# Patient Record
Sex: FEMALE | Race: WHITE | NOT HISPANIC OR LATINO | URBAN - METROPOLITAN AREA
[De-identification: names, ages, dates, MRNs, and addresses within clinical notes are randomized per-mention and may not be internally consistent; named-entity substitution may affect disease eponyms.]

---

## 2017-02-18 ENCOUNTER — EMERGENCY (EMERGENCY)
Facility: HOSPITAL | Age: 42
LOS: 1 days | Discharge: HOME | End: 2017-02-18
Attending: EMERGENCY MEDICINE
Payer: MEDICARE

## 2017-02-18 VITALS
TEMPERATURE: 98 F | SYSTOLIC BLOOD PRESSURE: 121 MMHG | DIASTOLIC BLOOD PRESSURE: 71 MMHG | HEART RATE: 86 BPM | WEIGHT: 130.07 LBS | RESPIRATION RATE: 16 BRPM | OXYGEN SATURATION: 100 %

## 2017-02-18 VITALS
HEART RATE: 87 BPM | DIASTOLIC BLOOD PRESSURE: 63 MMHG | OXYGEN SATURATION: 98 % | TEMPERATURE: 98 F | SYSTOLIC BLOOD PRESSURE: 110 MMHG | RESPIRATION RATE: 16 BRPM

## 2017-02-18 DIAGNOSIS — F31.9 BIPOLAR DISORDER, UNSPECIFIED: ICD-10-CM

## 2017-02-18 DIAGNOSIS — F32.89 OTHER SPECIFIED DEPRESSIVE EPISODES: ICD-10-CM

## 2017-02-18 DIAGNOSIS — R07.9 CHEST PAIN, UNSPECIFIED: ICD-10-CM

## 2017-02-18 DIAGNOSIS — Z86.73 PERSONAL HISTORY OF TRANSIENT ISCHEMIC ATTACK (TIA), AND CEREBRAL INFARCTION WITHOUT RESIDUAL DEFICITS: ICD-10-CM

## 2017-02-18 DIAGNOSIS — F41.9 ANXIETY DISORDER, UNSPECIFIED: ICD-10-CM

## 2017-02-18 DIAGNOSIS — Z88.2 ALLERGY STATUS TO SULFONAMIDES: ICD-10-CM

## 2017-02-18 LAB
ANION GAP SERPL CALC-SCNC: 6 MMOL/L — SIGNIFICANT CHANGE UP (ref 5–17)
BASOPHILS # BLD AUTO: 0.1 K/UL — SIGNIFICANT CHANGE UP (ref 0–0.2)
BASOPHILS NFR BLD AUTO: 1.2 % — SIGNIFICANT CHANGE UP (ref 0–2)
BUN SERPL-MCNC: 7 MG/DL — SIGNIFICANT CHANGE UP (ref 7–18)
CALCIUM SERPL-MCNC: 9.5 MG/DL — SIGNIFICANT CHANGE UP (ref 8.4–10.5)
CHLORIDE SERPL-SCNC: 108 MMOL/L — SIGNIFICANT CHANGE UP (ref 96–108)
CK SERPL-CCNC: 118 U/L — SIGNIFICANT CHANGE UP (ref 21–215)
CO2 SERPL-SCNC: 28 MMOL/L — SIGNIFICANT CHANGE UP (ref 22–31)
CREAT SERPL-MCNC: 0.72 MG/DL — SIGNIFICANT CHANGE UP (ref 0.5–1.3)
EOSINOPHIL # BLD AUTO: 0.1 K/UL — SIGNIFICANT CHANGE UP (ref 0–0.5)
EOSINOPHIL NFR BLD AUTO: 1.1 % — SIGNIFICANT CHANGE UP (ref 0–6)
GLUCOSE SERPL-MCNC: 78 MG/DL — SIGNIFICANT CHANGE UP (ref 70–99)
HCG SERPL-ACNC: <1 MIU/ML — SIGNIFICANT CHANGE UP
HCT VFR BLD CALC: 44.2 % — SIGNIFICANT CHANGE UP (ref 34.5–45)
HGB BLD-MCNC: 14.9 G/DL — SIGNIFICANT CHANGE UP (ref 11.5–15.5)
LYMPHOCYTES # BLD AUTO: 3.5 K/UL — HIGH (ref 1–3.3)
LYMPHOCYTES # BLD AUTO: 37.1 % — SIGNIFICANT CHANGE UP (ref 13–44)
MCHC RBC-ENTMCNC: 30.9 PG — SIGNIFICANT CHANGE UP (ref 27–34)
MCHC RBC-ENTMCNC: 33.7 GM/DL — SIGNIFICANT CHANGE UP (ref 32–36)
MCV RBC AUTO: 91.8 FL — SIGNIFICANT CHANGE UP (ref 80–100)
MONOCYTES # BLD AUTO: 0.6 K/UL — SIGNIFICANT CHANGE UP (ref 0–0.9)
MONOCYTES NFR BLD AUTO: 5.9 % — SIGNIFICANT CHANGE UP (ref 2–14)
NEUTROPHILS # BLD AUTO: 5.2 K/UL — SIGNIFICANT CHANGE UP (ref 1.8–7.4)
NEUTROPHILS NFR BLD AUTO: 54.8 % — SIGNIFICANT CHANGE UP (ref 43–77)
PLATELET # BLD AUTO: 318 K/UL — SIGNIFICANT CHANGE UP (ref 150–400)
POTASSIUM SERPL-MCNC: 3.7 MMOL/L — SIGNIFICANT CHANGE UP (ref 3.5–5.3)
POTASSIUM SERPL-SCNC: 3.7 MMOL/L — SIGNIFICANT CHANGE UP (ref 3.5–5.3)
RBC # BLD: 4.82 M/UL — SIGNIFICANT CHANGE UP (ref 3.8–5.2)
RBC # FLD: 11.8 % — SIGNIFICANT CHANGE UP (ref 10.3–14.5)
SODIUM SERPL-SCNC: 142 MMOL/L — SIGNIFICANT CHANGE UP (ref 135–145)
TROPONIN I SERPL-MCNC: <0.015 NG/ML — SIGNIFICANT CHANGE UP (ref 0–0.04)
WBC # BLD: 9.4 K/UL — SIGNIFICANT CHANGE UP (ref 3.8–10.5)
WBC # FLD AUTO: 9.4 K/UL — SIGNIFICANT CHANGE UP (ref 3.8–10.5)

## 2017-02-18 PROCEDURE — 99283 EMERGENCY DEPT VISIT LOW MDM: CPT | Mod: 25

## 2017-02-18 PROCEDURE — 82550 ASSAY OF CK (CPK): CPT

## 2017-02-18 PROCEDURE — 93005 ELECTROCARDIOGRAM TRACING: CPT

## 2017-02-18 PROCEDURE — 71046 X-RAY EXAM CHEST 2 VIEWS: CPT

## 2017-02-18 PROCEDURE — 84484 ASSAY OF TROPONIN QUANT: CPT

## 2017-02-18 PROCEDURE — 80048 BASIC METABOLIC PNL TOTAL CA: CPT

## 2017-02-18 PROCEDURE — 84702 CHORIONIC GONADOTROPIN TEST: CPT

## 2017-02-18 PROCEDURE — 99283 EMERGENCY DEPT VISIT LOW MDM: CPT

## 2017-02-18 PROCEDURE — 85027 COMPLETE CBC AUTOMATED: CPT

## 2017-02-18 PROCEDURE — 71020: CPT | Mod: 26

## 2017-02-18 RX ORDER — TRAMADOL HYDROCHLORIDE 50 MG/1
50 TABLET ORAL ONCE
Qty: 0 | Refills: 0 | Status: DISCONTINUED | OUTPATIENT
Start: 2017-02-18 | End: 2017-02-18

## 2017-02-18 RX ORDER — IBUPROFEN 200 MG
800 TABLET ORAL ONCE
Qty: 0 | Refills: 0 | Status: COMPLETED | OUTPATIENT
Start: 2017-02-18 | End: 2017-02-18

## 2017-02-18 RX ADMIN — Medication 800 MILLIGRAM(S): at 20:46

## 2017-02-18 RX ADMIN — TRAMADOL HYDROCHLORIDE 50 MILLIGRAM(S): 50 TABLET ORAL at 21:42

## 2017-02-18 RX ADMIN — Medication 800 MILLIGRAM(S): at 21:20

## 2017-02-18 NOTE — ED PROVIDER NOTE - OBJECTIVE STATEMENT
42 y/o F pt w/ PMHx of CVA, Pineal Gland Tumor, Anxiety, and Depression presents to ED c/o sharp, stabbing R sided chest pain x1 hour. Pt states that her pain radiates to her RUE; pt's pain is worsened w/ deep inspiration. Pt denies dyspnea, nausea, vomiting, lightheadedness, or any other complaints. Pt is not a smoker and reports no history of similar symptoms in the past. Pt is allergic to multiple drugs as listed.

## 2017-02-18 NOTE — ED PROVIDER NOTE - PMH
3/09 Seizure--etiology, abrupt withdrawal from benzodiazepines    Anorexia/bolemia from age [age]    Anxiety    Bipolar disorder NOS    borderline mood disorder    Brain tumor    Depression  multiple hospitalizations for attempted suicide---self committed herself to [facility] this past month.  Discharged 10/1/09  Lumbar Herniated Disc  intermittent b/l sciatica with R>L  Posttraumatic Stress Disorder  pt witnessed her father's suicide at the age of 38 with a gun at the age of 5  Brother, age 38, committed suicide on the father's anniversary of the father's suicide on 9/10/09  Smoker    Stroke  1 month ago  Syncope and collapse

## 2017-02-18 NOTE — ED ADULT NURSE NOTE - PMH
3/09 Seizure--etiology, abrupt withdrawal from benzodiazepines    Anorexia/bolemia from age [age]    Anxiety    Bipolar disorder NOS    borderline mood disorder    Brain tumor    Depression  multiple hospitalizations for attempted suicide---self committed herself to [facility] this past month.  Discharged 10/1/09  Lumbar Herniated Disc  intermittent b/l sciatica with R>L  Posttraumatic Stress Disorder  pt witnessed her father's suicide at the age of 38 with a gun at the age of 5  Brother, age 38, committed suicide on the father's anniversary of the father's suicide on 9/10/09  Smoker    Stroke  1 month ago  Syncope and collapse

## 2017-02-18 NOTE — ED ADULT NURSE NOTE - OBJECTIVE STATEMENT
Pt. is a&ox3, stable, and in no acute distress. Pt. c/o chest pain that is radiating to her right arm that started an hour ago while watching TV.

## 2017-03-19 ENCOUNTER — EMERGENCY (EMERGENCY)
Facility: HOSPITAL | Age: 42
LOS: 1 days | Discharge: HOME | End: 2017-03-19
Attending: EMERGENCY MEDICINE | Admitting: EMERGENCY MEDICINE
Payer: MEDICARE

## 2017-03-19 VITALS
WEIGHT: 139.99 LBS | TEMPERATURE: 98 F | SYSTOLIC BLOOD PRESSURE: 129 MMHG | HEART RATE: 94 BPM | OXYGEN SATURATION: 100 % | DIASTOLIC BLOOD PRESSURE: 82 MMHG | RESPIRATION RATE: 16 BRPM

## 2017-03-19 DIAGNOSIS — M79.662 PAIN IN LEFT LOWER LEG: ICD-10-CM

## 2017-03-19 LAB
BASOPHILS # BLD AUTO: 0.1 K/UL — SIGNIFICANT CHANGE UP (ref 0–0.2)
BASOPHILS NFR BLD AUTO: 0.6 % — SIGNIFICANT CHANGE UP (ref 0–2)
EOSINOPHIL # BLD AUTO: 0.1 K/UL — SIGNIFICANT CHANGE UP (ref 0–0.5)
EOSINOPHIL NFR BLD AUTO: 1.2 % — SIGNIFICANT CHANGE UP (ref 0–6)
HCT VFR BLD CALC: 42.7 % — SIGNIFICANT CHANGE UP (ref 34.5–45)
HGB BLD-MCNC: 14.4 G/DL — SIGNIFICANT CHANGE UP (ref 11.5–15.5)
LYMPHOCYTES # BLD AUTO: 3.1 K/UL — SIGNIFICANT CHANGE UP (ref 1–3.3)
LYMPHOCYTES # BLD AUTO: 33 % — SIGNIFICANT CHANGE UP (ref 13–44)
MCHC RBC-ENTMCNC: 31.7 PG — SIGNIFICANT CHANGE UP (ref 27–34)
MCHC RBC-ENTMCNC: 33.8 GM/DL — SIGNIFICANT CHANGE UP (ref 32–36)
MCV RBC AUTO: 93.8 FL — SIGNIFICANT CHANGE UP (ref 80–100)
MONOCYTES # BLD AUTO: 0.7 K/UL — SIGNIFICANT CHANGE UP (ref 0–0.9)
MONOCYTES NFR BLD AUTO: 8 % — SIGNIFICANT CHANGE UP (ref 2–14)
NEUTROPHILS # BLD AUTO: 5.3 K/UL — SIGNIFICANT CHANGE UP (ref 1.8–7.4)
NEUTROPHILS NFR BLD AUTO: 57.2 % — SIGNIFICANT CHANGE UP (ref 43–77)
PLATELET # BLD AUTO: 270 K/UL — SIGNIFICANT CHANGE UP (ref 150–400)
RBC # BLD: 4.55 M/UL — SIGNIFICANT CHANGE UP (ref 3.8–5.2)
RBC # FLD: 11.9 % — SIGNIFICANT CHANGE UP (ref 10.3–14.5)
WBC # BLD: 9.3 K/UL — SIGNIFICANT CHANGE UP (ref 3.8–10.5)
WBC # FLD AUTO: 9.3 K/UL — SIGNIFICANT CHANGE UP (ref 3.8–10.5)

## 2017-03-19 PROCEDURE — 93010 ELECTROCARDIOGRAM REPORT: CPT

## 2017-03-19 PROCEDURE — 99284 EMERGENCY DEPT VISIT MOD MDM: CPT | Mod: 25,GC

## 2017-03-19 NOTE — ED PROVIDER NOTE - OBJECTIVE STATEMENT
41yF with recent stroke (R sided weakness, improved, now on ASA 81 daily), smoker, remote h/o pineal gland tumor s/p radiation and chemo (in remission) presents with worsening LLE pain, sent in from urgent care for US to r/o DVT. Reports worsening LLE pain and cold foot x 2 weeks, worse today. Pain most severe in great toe. Pain making it difficult to ambulate. No exogenous estrogen. Week long admission after stroke last month. 41yF with recent stroke (R sided weakness, no residual, now on ASA 81 daily), smoker, remote h/o pineal gland tumor s/p radiation and chemo (in remission), chronic ankle pain on oxycodone 30mg 4 times daily presents with worsening LLE pain, sent in from urgent care for US to r/o DVT. Reports worsening LLE pain and cold foot x 2 weeks, worse today. Pain most severe in great toe. Pain making it difficult to ambulate. No exogenous estrogen. Week long admission after stroke last month.

## 2017-03-19 NOTE — ED PROVIDER NOTE - MEDICAL DECISION MAKING DETAILS
Nemes - 42yo F w recent dg of stroke in the ER for 2 wks of L foot pain, radiating in the L calf and cold sensation. No trauma. VS wnl, cooler L foot, cap refill mildly decreased compared to R foot, min decreased pulses. Mild L calf tenderness. Will get CTA r/o arterial obstruction, US r/o DVT.
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
## 2017-03-19 NOTE — ED ADULT TRIAGE NOTE - CHIEF COMPLAINT QUOTE
right calf pain x2 wks worsening today. Pt had gotten a pedicure and feels like her toe is infected.  Pt reports that her toe is discolored and the skin is cold. referred for ultrasound

## 2017-03-19 NOTE — ED PROVIDER NOTE - PROGRESS NOTE DETAILS
CTA negative. Evaluated by vascular, cleared for discharge, do not recommend outpatient vascular follow-up.  [place] PGY3

## 2017-03-19 NOTE — ED PROVIDER NOTE - PMH
3/09 Seizure--etiology, abrupt withdrawal from benzodiazepines    Anorexia/bolemia from age [age]    Anxiety    Back pain, unspecified back location, unspecified back pain laterality, unspecified chronicity    Bipolar disorder NOS    borderline mood disorder    Brain tumor    Cerebrovascular accident (CVA) due to other mechanism    Depression  multiple hospitalizations for attempted suicide---self committed herself to [facility] this past month.  Discharged 10/1/09  Lumbar Herniated Disc  intermittent b/l sciatica with R>L  Posttraumatic Stress Disorder  pt witnessed her father's suicide at the age of 38 with a gun at the age of 5  Brother, age 38, committed suicide on the father's anniversary of the father's suicide on 9/10/09  Smoker    Stroke  1 month ago  Syncope and collapse

## 2017-03-19 NOTE — ED ADULT NURSE NOTE - OBJECTIVE STATEMENT
40 yo ambulatory female pt presents to ed complaining of left great toe pain that radiates to the left calf. as per pt she got a pedicure two weeks ago and her two started to hurt than. pt states the pain became unbearable last night and she is no longer able to walk. cap refill brisk. pedal pulses strong and equal bilaterally. skin intact. no redness/warmth to toe or leg. breath sounds clear and equal bilaterally. abd nontender and nondistended. pt afebrile/ denies fever/chills/n/v.

## 2017-03-20 VITALS — DIASTOLIC BLOOD PRESSURE: 79 MMHG | SYSTOLIC BLOOD PRESSURE: 121 MMHG

## 2017-03-20 LAB
ALBUMIN SERPL ELPH-MCNC: 4.3 G/DL — SIGNIFICANT CHANGE UP (ref 3.3–5)
ALP SERPL-CCNC: 58 U/L — SIGNIFICANT CHANGE UP (ref 40–120)
ALT FLD-CCNC: 18 U/L RC — SIGNIFICANT CHANGE UP (ref 10–45)
ANION GAP SERPL CALC-SCNC: 14 MMOL/L — SIGNIFICANT CHANGE UP (ref 5–17)
APTT BLD: 31.1 SEC — SIGNIFICANT CHANGE UP (ref 27.5–37.4)
AST SERPL-CCNC: 20 U/L — SIGNIFICANT CHANGE UP (ref 10–40)
BILIRUB SERPL-MCNC: 0.2 MG/DL — SIGNIFICANT CHANGE UP (ref 0.2–1.2)
BUN SERPL-MCNC: 10 MG/DL — SIGNIFICANT CHANGE UP (ref 7–23)
CALCIUM SERPL-MCNC: 9.6 MG/DL — SIGNIFICANT CHANGE UP (ref 8.4–10.5)
CHLORIDE SERPL-SCNC: 103 MMOL/L — SIGNIFICANT CHANGE UP (ref 96–108)
CO2 SERPL-SCNC: 24 MMOL/L — SIGNIFICANT CHANGE UP (ref 22–31)
CREAT SERPL-MCNC: 0.75 MG/DL — SIGNIFICANT CHANGE UP (ref 0.5–1.3)
GAS PNL BLDV: SIGNIFICANT CHANGE UP
GLUCOSE SERPL-MCNC: 113 MG/DL — HIGH (ref 70–99)
INR BLD: 1.05 RATIO — SIGNIFICANT CHANGE UP (ref 0.88–1.16)
POTASSIUM SERPL-MCNC: 3.6 MMOL/L — SIGNIFICANT CHANGE UP (ref 3.5–5.3)
POTASSIUM SERPL-SCNC: 3.6 MMOL/L — SIGNIFICANT CHANGE UP (ref 3.5–5.3)
PROT SERPL-MCNC: 7.6 G/DL — SIGNIFICANT CHANGE UP (ref 6–8.3)
PROTHROM AB SERPL-ACNC: 11.4 SEC — SIGNIFICANT CHANGE UP (ref 10–13.1)
SODIUM SERPL-SCNC: 141 MMOL/L — SIGNIFICANT CHANGE UP (ref 135–145)

## 2017-03-20 PROCEDURE — 93971 EXTREMITY STUDY: CPT

## 2017-03-20 PROCEDURE — 82330 ASSAY OF CALCIUM: CPT

## 2017-03-20 PROCEDURE — 80053 COMPREHEN METABOLIC PANEL: CPT

## 2017-03-20 PROCEDURE — 85014 HEMATOCRIT: CPT

## 2017-03-20 PROCEDURE — 85730 THROMBOPLASTIN TIME PARTIAL: CPT

## 2017-03-20 PROCEDURE — 75635 CT ANGIO ABDOMINAL ARTERIES: CPT

## 2017-03-20 PROCEDURE — 82947 ASSAY GLUCOSE BLOOD QUANT: CPT

## 2017-03-20 PROCEDURE — 75635 CT ANGIO ABDOMINAL ARTERIES: CPT | Mod: 26

## 2017-03-20 PROCEDURE — 82550 ASSAY OF CK (CPK): CPT

## 2017-03-20 PROCEDURE — 93005 ELECTROCARDIOGRAM TRACING: CPT

## 2017-03-20 PROCEDURE — 96374 THER/PROPH/DIAG INJ IV PUSH: CPT | Mod: XU

## 2017-03-20 PROCEDURE — 82803 BLOOD GASES ANY COMBINATION: CPT

## 2017-03-20 PROCEDURE — 84132 ASSAY OF SERUM POTASSIUM: CPT

## 2017-03-20 PROCEDURE — 85027 COMPLETE CBC AUTOMATED: CPT

## 2017-03-20 PROCEDURE — 85610 PROTHROMBIN TIME: CPT

## 2017-03-20 PROCEDURE — 83605 ASSAY OF LACTIC ACID: CPT

## 2017-03-20 PROCEDURE — 99284 EMERGENCY DEPT VISIT MOD MDM: CPT | Mod: 25

## 2017-03-20 PROCEDURE — 93971 EXTREMITY STUDY: CPT | Mod: 26

## 2017-03-20 PROCEDURE — 82435 ASSAY OF BLOOD CHLORIDE: CPT

## 2017-03-20 PROCEDURE — 84295 ASSAY OF SERUM SODIUM: CPT

## 2017-03-20 RX ORDER — ALPRAZOLAM 0.25 MG
1 TABLET ORAL ONCE
Qty: 0 | Refills: 0 | Status: DISCONTINUED | OUTPATIENT
Start: 2017-03-20 | End: 2017-03-20

## 2017-03-20 RX ORDER — OXYCODONE HYDROCHLORIDE 5 MG/1
10 TABLET ORAL ONCE
Qty: 0 | Refills: 0 | Status: DISCONTINUED | OUTPATIENT
Start: 2017-03-20 | End: 2017-03-20

## 2017-03-20 RX ORDER — ACETAMINOPHEN 500 MG
1000 TABLET ORAL ONCE
Qty: 0 | Refills: 0 | Status: COMPLETED | OUTPATIENT
Start: 2017-03-20 | End: 2017-03-20

## 2017-03-20 RX ADMIN — OXYCODONE HYDROCHLORIDE 10 MILLIGRAM(S): 5 TABLET ORAL at 04:50

## 2017-03-20 RX ADMIN — Medication 400 MILLIGRAM(S): at 02:28

## 2017-03-20 RX ADMIN — Medication 1000 MILLIGRAM(S): at 03:00

## 2017-03-20 RX ADMIN — Medication 1 MILLIGRAM(S): at 01:56

## 2017-03-20 RX ADMIN — OXYCODONE HYDROCHLORIDE 10 MILLIGRAM(S): 5 TABLET ORAL at 04:15

## 2017-03-20 NOTE — ED ADULT NURSE REASSESSMENT NOTE - NS ED NURSE REASSESS COMMENT FT1
pt off to CTA scan  spouse at bedside
pt refused vitals being taken upon discharge.
pt sleeping in bed comfortably. family at bedside. safety maintained. will continue to monitor.
vascular at bedside.
VBG drawn and sent as per MD orders  pt complaining of increase in pain, 7/10  MD Hubbard notified, will order Ofirmev for pain control  UCG negative, CT scan notified

## 2017-03-21 NOTE — ED POST DISCHARGE NOTE - ADDITIONAL DOCUMENTATION
pt called with concern for low o2 level by VBG in blood work. pt reports that when she stands up "everything goes black" and she feels as though she is going to "pass out." pt instructed to return to Emergency Department now. pt understands.

## 2017-03-29 ENCOUNTER — APPOINTMENT (OUTPATIENT)
Dept: NEUROLOGY | Facility: CLINIC | Age: 42
End: 2017-03-29

## 2017-05-11 ENCOUNTER — TRANSCRIPTION ENCOUNTER (OUTPATIENT)
Age: 42
End: 2017-05-11

## 2017-05-11 ENCOUNTER — INPATIENT (INPATIENT)
Facility: HOSPITAL | Age: 42
LOS: 0 days | Discharge: HOME | DRG: 312 | End: 2017-05-12
Attending: INTERNAL MEDICINE | Admitting: INTERNAL MEDICINE
Payer: COMMERCIAL

## 2017-05-11 VITALS
HEIGHT: 67 IN | RESPIRATION RATE: 18 BRPM | DIASTOLIC BLOOD PRESSURE: 66 MMHG | HEART RATE: 60 BPM | OXYGEN SATURATION: 100 % | WEIGHT: 126.99 LBS | SYSTOLIC BLOOD PRESSURE: 142 MMHG | TEMPERATURE: 98 F

## 2017-05-11 DIAGNOSIS — R45.851 SUICIDAL IDEATIONS: ICD-10-CM

## 2017-05-11 DIAGNOSIS — F19.10 OTHER PSYCHOACTIVE SUBSTANCE ABUSE, UNCOMPLICATED: ICD-10-CM

## 2017-05-11 DIAGNOSIS — F17.200 NICOTINE DEPENDENCE, UNSPECIFIED, UNCOMPLICATED: ICD-10-CM

## 2017-05-11 DIAGNOSIS — R55 SYNCOPE AND COLLAPSE: ICD-10-CM

## 2017-05-11 DIAGNOSIS — F32.9 MAJOR DEPRESSIVE DISORDER, SINGLE EPISODE, UNSPECIFIED: ICD-10-CM

## 2017-05-11 DIAGNOSIS — Z29.9 ENCOUNTER FOR PROPHYLACTIC MEASURES, UNSPECIFIED: ICD-10-CM

## 2017-05-11 LAB
ALBUMIN SERPL ELPH-MCNC: 3.1 G/DL — LOW (ref 3.5–5)
ALBUMIN SERPL ELPH-MCNC: 3.8 G/DL — SIGNIFICANT CHANGE UP (ref 3.5–5)
ALP SERPL-CCNC: 47 U/L — SIGNIFICANT CHANGE UP (ref 40–120)
ALP SERPL-CCNC: 55 U/L — SIGNIFICANT CHANGE UP (ref 40–120)
ALT FLD-CCNC: 20 U/L DA — SIGNIFICANT CHANGE UP (ref 10–60)
ALT FLD-CCNC: 21 U/L DA — SIGNIFICANT CHANGE UP (ref 10–60)
ANION GAP SERPL CALC-SCNC: 4 MMOL/L — LOW (ref 5–17)
ANION GAP SERPL CALC-SCNC: 7 MMOL/L — SIGNIFICANT CHANGE UP (ref 5–17)
APAP SERPL-MCNC: <2 UG/ML — LOW (ref 10–30)
APPEARANCE UR: CLEAR — SIGNIFICANT CHANGE UP
AST SERPL-CCNC: 15 U/L — SIGNIFICANT CHANGE UP (ref 10–40)
AST SERPL-CCNC: 15 U/L — SIGNIFICANT CHANGE UP (ref 10–40)
BASOPHILS # BLD AUTO: 0.1 K/UL — SIGNIFICANT CHANGE UP (ref 0–0.2)
BASOPHILS # BLD AUTO: 0.1 K/UL — SIGNIFICANT CHANGE UP (ref 0–0.2)
BASOPHILS NFR BLD AUTO: 0.9 % — SIGNIFICANT CHANGE UP (ref 0–2)
BASOPHILS NFR BLD AUTO: 1.2 % — SIGNIFICANT CHANGE UP (ref 0–2)
BILIRUB SERPL-MCNC: 0.2 MG/DL — SIGNIFICANT CHANGE UP (ref 0.2–1.2)
BILIRUB SERPL-MCNC: 0.2 MG/DL — SIGNIFICANT CHANGE UP (ref 0.2–1.2)
BILIRUB UR-MCNC: NEGATIVE — SIGNIFICANT CHANGE UP
BUN SERPL-MCNC: 7 MG/DL — SIGNIFICANT CHANGE UP (ref 7–18)
BUN SERPL-MCNC: 9 MG/DL — SIGNIFICANT CHANGE UP (ref 7–18)
CALCIUM SERPL-MCNC: 8.4 MG/DL — SIGNIFICANT CHANGE UP (ref 8.4–10.5)
CALCIUM SERPL-MCNC: 9.3 MG/DL — SIGNIFICANT CHANGE UP (ref 8.4–10.5)
CHLORIDE SERPL-SCNC: 108 MMOL/L — SIGNIFICANT CHANGE UP (ref 96–108)
CHLORIDE SERPL-SCNC: 111 MMOL/L — HIGH (ref 96–108)
CHOLEST SERPL-MCNC: 141 MG/DL — SIGNIFICANT CHANGE UP (ref 10–199)
CK MB BLD-MCNC: 2.1 % — SIGNIFICANT CHANGE UP (ref 0–3.5)
CK MB CFR SERPL CALC: 1.4 NG/ML — SIGNIFICANT CHANGE UP (ref 0–3.6)
CK SERPL-CCNC: 68 U/L — SIGNIFICANT CHANGE UP (ref 21–215)
CO2 SERPL-SCNC: 28 MMOL/L — SIGNIFICANT CHANGE UP (ref 22–31)
CO2 SERPL-SCNC: 31 MMOL/L — SIGNIFICANT CHANGE UP (ref 22–31)
COLOR SPEC: YELLOW — SIGNIFICANT CHANGE UP
CREAT SERPL-MCNC: 0.64 MG/DL — SIGNIFICANT CHANGE UP (ref 0.5–1.3)
CREAT SERPL-MCNC: 0.75 MG/DL — SIGNIFICANT CHANGE UP (ref 0.5–1.3)
DIFF PNL FLD: ABNORMAL
EOSINOPHIL # BLD AUTO: 0.1 K/UL — SIGNIFICANT CHANGE UP (ref 0–0.5)
EOSINOPHIL # BLD AUTO: 0.1 K/UL — SIGNIFICANT CHANGE UP (ref 0–0.5)
EOSINOPHIL NFR BLD AUTO: 1 % — SIGNIFICANT CHANGE UP (ref 0–6)
EOSINOPHIL NFR BLD AUTO: 1.9 % — SIGNIFICANT CHANGE UP (ref 0–6)
ETHANOL SERPL-MCNC: <3 MG/DL — SIGNIFICANT CHANGE UP (ref 0–10)
FOLATE SERPL-MCNC: 9.1 NG/ML — SIGNIFICANT CHANGE UP (ref 4.8–24.2)
GLUCOSE SERPL-MCNC: 105 MG/DL — HIGH (ref 70–99)
GLUCOSE SERPL-MCNC: 78 MG/DL — SIGNIFICANT CHANGE UP (ref 70–99)
GLUCOSE UR QL: NEGATIVE — SIGNIFICANT CHANGE UP
HBA1C BLD-MCNC: 5.3 % — SIGNIFICANT CHANGE UP (ref 4–5.6)
HCG UR QL: NEGATIVE — SIGNIFICANT CHANGE UP
HCT VFR BLD CALC: 34.5 % — SIGNIFICANT CHANGE UP (ref 34.5–45)
HCT VFR BLD CALC: 37.3 % — SIGNIFICANT CHANGE UP (ref 34.5–45)
HDLC SERPL-MCNC: 39 MG/DL — LOW (ref 40–125)
HGB BLD-MCNC: 11.4 G/DL — LOW (ref 11.5–15.5)
HGB BLD-MCNC: 12.7 G/DL — SIGNIFICANT CHANGE UP (ref 11.5–15.5)
KETONES UR-MCNC: NEGATIVE — SIGNIFICANT CHANGE UP
LEUKOCYTE ESTERASE UR-ACNC: NEGATIVE — SIGNIFICANT CHANGE UP
LIDOCAIN IGE QN: 65 U/L — LOW (ref 73–393)
LIPID PNL WITH DIRECT LDL SERPL: 83 MG/DL — SIGNIFICANT CHANGE UP
LYMPHOCYTES # BLD AUTO: 3.8 K/UL — HIGH (ref 1–3.3)
LYMPHOCYTES # BLD AUTO: 4.2 K/UL — HIGH (ref 1–3.3)
LYMPHOCYTES # BLD AUTO: 46.9 % — HIGH (ref 13–44)
LYMPHOCYTES # BLD AUTO: 57.5 % — HIGH (ref 13–44)
MAGNESIUM SERPL-MCNC: 1.9 MG/DL — SIGNIFICANT CHANGE UP (ref 1.8–2.4)
MCHC RBC-ENTMCNC: 31.3 PG — SIGNIFICANT CHANGE UP (ref 27–34)
MCHC RBC-ENTMCNC: 31.7 PG — SIGNIFICANT CHANGE UP (ref 27–34)
MCHC RBC-ENTMCNC: 33 GM/DL — SIGNIFICANT CHANGE UP (ref 32–36)
MCHC RBC-ENTMCNC: 34.1 GM/DL — SIGNIFICANT CHANGE UP (ref 32–36)
MCV RBC AUTO: 93 FL — SIGNIFICANT CHANGE UP (ref 80–100)
MCV RBC AUTO: 94.7 FL — SIGNIFICANT CHANGE UP (ref 80–100)
MONOCYTES # BLD AUTO: 0.5 K/UL — SIGNIFICANT CHANGE UP (ref 0–0.9)
MONOCYTES # BLD AUTO: 0.5 K/UL — SIGNIFICANT CHANGE UP (ref 0–0.9)
MONOCYTES NFR BLD AUTO: 6.4 % — SIGNIFICANT CHANGE UP (ref 2–14)
MONOCYTES NFR BLD AUTO: 7.2 % — SIGNIFICANT CHANGE UP (ref 2–14)
NEUTROPHILS # BLD AUTO: 2.4 K/UL — SIGNIFICANT CHANGE UP (ref 1.8–7.4)
NEUTROPHILS # BLD AUTO: 3.7 K/UL — SIGNIFICANT CHANGE UP (ref 1.8–7.4)
NEUTROPHILS NFR BLD AUTO: 32.2 % — LOW (ref 43–77)
NEUTROPHILS NFR BLD AUTO: 44.8 % — SIGNIFICANT CHANGE UP (ref 43–77)
NITRITE UR-MCNC: NEGATIVE — SIGNIFICANT CHANGE UP
PCP SPEC-MCNC: SIGNIFICANT CHANGE UP
PH UR: 7 — SIGNIFICANT CHANGE UP (ref 5–8)
PHOSPHATE SERPL-MCNC: 2.2 MG/DL — LOW (ref 2.5–4.5)
PLATELET # BLD AUTO: 233 K/UL — SIGNIFICANT CHANGE UP (ref 150–400)
PLATELET # BLD AUTO: 282 K/UL — SIGNIFICANT CHANGE UP (ref 150–400)
POTASSIUM SERPL-MCNC: 3.6 MMOL/L — SIGNIFICANT CHANGE UP (ref 3.5–5.3)
POTASSIUM SERPL-MCNC: 3.7 MMOL/L — SIGNIFICANT CHANGE UP (ref 3.5–5.3)
POTASSIUM SERPL-SCNC: 3.6 MMOL/L — SIGNIFICANT CHANGE UP (ref 3.5–5.3)
POTASSIUM SERPL-SCNC: 3.7 MMOL/L — SIGNIFICANT CHANGE UP (ref 3.5–5.3)
PROT SERPL-MCNC: 6.1 G/DL — SIGNIFICANT CHANGE UP (ref 6–8.3)
PROT SERPL-MCNC: 7.7 G/DL — SIGNIFICANT CHANGE UP (ref 6–8.3)
PROT UR-MCNC: NEGATIVE — SIGNIFICANT CHANGE UP
RBC # BLD: 3.64 M/UL — LOW (ref 3.8–5.2)
RBC # BLD: 4 M/UL — SIGNIFICANT CHANGE UP (ref 3.8–5.2)
RBC # FLD: 11.7 % — SIGNIFICANT CHANGE UP (ref 10.3–14.5)
RBC # FLD: 12.1 % — SIGNIFICANT CHANGE UP (ref 10.3–14.5)
SALICYLATES SERPL-MCNC: 5.3 MG/DL — SIGNIFICANT CHANGE UP (ref 2.8–20)
SODIUM SERPL-SCNC: 143 MMOL/L — SIGNIFICANT CHANGE UP (ref 135–145)
SODIUM SERPL-SCNC: 146 MMOL/L — HIGH (ref 135–145)
SP GR SPEC: 1.01 — SIGNIFICANT CHANGE UP (ref 1.01–1.02)
TOTAL CHOLESTEROL/HDL RATIO MEASUREMENT: 3.6 RATIO — SIGNIFICANT CHANGE UP (ref 3.3–7.1)
TRIGL SERPL-MCNC: 94 MG/DL — SIGNIFICANT CHANGE UP (ref 10–149)
TROPONIN I SERPL-MCNC: <0.015 NG/ML — SIGNIFICANT CHANGE UP (ref 0–0.04)
TROPONIN I SERPL-MCNC: <0.015 NG/ML — SIGNIFICANT CHANGE UP (ref 0–0.04)
UROBILINOGEN FLD QL: NEGATIVE — SIGNIFICANT CHANGE UP
VIT B12 SERPL-MCNC: 807 PG/ML — SIGNIFICANT CHANGE UP (ref 243–894)
WBC # BLD: 7.3 K/UL — SIGNIFICANT CHANGE UP (ref 3.8–10.5)
WBC # BLD: 8.2 K/UL — SIGNIFICANT CHANGE UP (ref 3.8–10.5)
WBC # FLD AUTO: 7.3 K/UL — SIGNIFICANT CHANGE UP (ref 3.8–10.5)
WBC # FLD AUTO: 8.2 K/UL — SIGNIFICANT CHANGE UP (ref 3.8–10.5)

## 2017-05-11 PROCEDURE — 70450 CT HEAD/BRAIN W/O DYE: CPT | Mod: 26

## 2017-05-11 PROCEDURE — 71010: CPT | Mod: 26

## 2017-05-11 PROCEDURE — 99223 1ST HOSP IP/OBS HIGH 75: CPT | Mod: AI,GC

## 2017-05-11 PROCEDURE — 99285 EMERGENCY DEPT VISIT HI MDM: CPT | Mod: 25

## 2017-05-11 PROCEDURE — 99222 1ST HOSP IP/OBS MODERATE 55: CPT

## 2017-05-11 RX ORDER — ONDANSETRON 8 MG/1
4 TABLET, FILM COATED ORAL ONCE
Qty: 0 | Refills: 0 | Status: COMPLETED | OUTPATIENT
Start: 2017-05-11 | End: 2017-05-11

## 2017-05-11 RX ORDER — NICOTINE POLACRILEX 2 MG
1 GUM BUCCAL DAILY
Qty: 0 | Refills: 0 | Status: DISCONTINUED | OUTPATIENT
Start: 2017-05-11 | End: 2017-05-12

## 2017-05-11 RX ORDER — ALPRAZOLAM 0.25 MG
1 TABLET ORAL THREE TIMES A DAY
Qty: 0 | Refills: 0 | Status: DISCONTINUED | OUTPATIENT
Start: 2017-05-11 | End: 2017-05-12

## 2017-05-11 RX ORDER — SODIUM CHLORIDE 9 MG/ML
1000 INJECTION INTRAMUSCULAR; INTRAVENOUS; SUBCUTANEOUS
Qty: 0 | Refills: 0 | Status: DISCONTINUED | OUTPATIENT
Start: 2017-05-11 | End: 2017-05-11

## 2017-05-11 RX ORDER — DEXTROAMPHETAMINE SACCHARATE, AMPHETAMINE ASPARTATE, DEXTROAMPHETAMINE SULFATE AND AMPHETAMINE SULFATE 1.875; 1.875; 1.875; 1.875 MG/1; MG/1; MG/1; MG/1
30 TABLET ORAL
Qty: 0 | Refills: 0 | Status: DISCONTINUED | OUTPATIENT
Start: 2017-05-11 | End: 2017-05-11

## 2017-05-11 RX ORDER — FLUOXETINE HCL 10 MG
20 CAPSULE ORAL DAILY
Qty: 0 | Refills: 0 | Status: DISCONTINUED | OUTPATIENT
Start: 2017-05-11 | End: 2017-05-12

## 2017-05-11 RX ORDER — PANTOPRAZOLE SODIUM 20 MG/1
40 TABLET, DELAYED RELEASE ORAL
Qty: 0 | Refills: 0 | Status: DISCONTINUED | OUTPATIENT
Start: 2017-05-11 | End: 2017-05-12

## 2017-05-11 RX ORDER — ONDANSETRON 8 MG/1
4 TABLET, FILM COATED ORAL EVERY 8 HOURS
Qty: 0 | Refills: 0 | Status: DISCONTINUED | OUTPATIENT
Start: 2017-05-11 | End: 2017-05-12

## 2017-05-11 RX ORDER — SODIUM CHLORIDE 9 MG/ML
1000 INJECTION INTRAMUSCULAR; INTRAVENOUS; SUBCUTANEOUS ONCE
Qty: 0 | Refills: 0 | Status: COMPLETED | OUTPATIENT
Start: 2017-05-11 | End: 2017-05-11

## 2017-05-11 RX ORDER — OXYCODONE HYDROCHLORIDE 5 MG/1
1 TABLET ORAL
Qty: 0 | Refills: 0 | COMMUNITY

## 2017-05-11 RX ORDER — OXYCODONE HYDROCHLORIDE 5 MG/1
5 TABLET ORAL EVERY 6 HOURS
Qty: 0 | Refills: 0 | Status: DISCONTINUED | OUTPATIENT
Start: 2017-05-11 | End: 2017-05-12

## 2017-05-11 RX ORDER — SODIUM CHLORIDE 9 MG/ML
1000 INJECTION INTRAMUSCULAR; INTRAVENOUS; SUBCUTANEOUS
Qty: 0 | Refills: 0 | Status: DISCONTINUED | OUTPATIENT
Start: 2017-05-11 | End: 2017-05-12

## 2017-05-11 RX ORDER — RISPERIDONE 4 MG/1
1 TABLET ORAL
Qty: 0 | Refills: 0 | COMMUNITY

## 2017-05-11 RX ADMIN — SODIUM CHLORIDE 60 MILLILITER(S): 9 INJECTION INTRAMUSCULAR; INTRAVENOUS; SUBCUTANEOUS at 07:06

## 2017-05-11 RX ADMIN — Medication 20 MILLIGRAM(S): at 15:36

## 2017-05-11 RX ADMIN — Medication 1 PATCH: at 15:36

## 2017-05-11 RX ADMIN — PANTOPRAZOLE SODIUM 40 MILLIGRAM(S): 20 TABLET, DELAYED RELEASE ORAL at 15:36

## 2017-05-11 RX ADMIN — OXYCODONE HYDROCHLORIDE 5 MILLIGRAM(S): 5 TABLET ORAL at 20:33

## 2017-05-11 RX ADMIN — OXYCODONE HYDROCHLORIDE 5 MILLIGRAM(S): 5 TABLET ORAL at 19:35

## 2017-05-11 RX ADMIN — ONDANSETRON 4 MILLIGRAM(S): 8 TABLET, FILM COATED ORAL at 02:26

## 2017-05-11 RX ADMIN — SODIUM CHLORIDE 1000 MILLILITER(S): 9 INJECTION INTRAMUSCULAR; INTRAVENOUS; SUBCUTANEOUS at 02:26

## 2017-05-11 RX ADMIN — SODIUM CHLORIDE 1000 MILLILITER(S): 9 INJECTION INTRAMUSCULAR; INTRAVENOUS; SUBCUTANEOUS at 02:27

## 2017-05-11 RX ADMIN — Medication 1 MILLIGRAM(S): at 13:03

## 2017-05-11 RX ADMIN — SODIUM CHLORIDE 70 MILLILITER(S): 9 INJECTION INTRAMUSCULAR; INTRAVENOUS; SUBCUTANEOUS at 13:00

## 2017-05-11 NOTE — DISCHARGE NOTE ADULT - NSTOBACCOHOTLINE_GEN_A_CS
[facility] Smokers Quitline ([phone]) [facility] Smokers Quitline ([phone])

## 2017-05-11 NOTE — H&P ADULT - NEUROLOGICAL DETAILS
deep reflexes intact/superficial reflexes intact/responds to pain/no spontaneous movement/alert and oriented x 3/normal strength/sensation intact/cranial nerves intact/responds to verbal commands

## 2017-05-11 NOTE — BEHAVIORAL HEALTH ASSESSMENT NOTE - NSBHCHARTREVIEWVS_PSY_A_CORE FT
Vital Signs Last 24 Hrs  T(C): 36.8, Max: 36.9 (05-11 @ 00:26)  T(F): 98.2, Max: 98.4 (05-11 @ 00:26)  HR: 79 (54 - 79)  BP: 96/55 (96/55 - 142/66)  BP(mean): --  RR: 18 (18 - 18)  SpO2: 99% (99% - 100%)

## 2017-05-11 NOTE — H&P ADULT - NEGATIVE ENMT SYMPTOMS
no nasal congestion/no ear pain/no hearing difficulty/no vertigo/no nasal discharge/no sinus symptoms/no tinnitus

## 2017-05-11 NOTE — DISCHARGE NOTE ADULT - NS AS DC STROKE ED MATERIALS
Need for Followup After Discharge/Stroke Warning Signs and Symptoms/Risk Factors for Stroke/Call 911 for Stroke/Prescribed Medications/Stroke Education Booklet

## 2017-05-11 NOTE — H&P ADULT - NEGATIVE CARDIOVASCULAR SYMPTOMS
no chest pain/no palpitations/no orthopnea/no paroxysmal nocturnal dyspnea/no dyspnea on exertion/no peripheral edema
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
## 2017-05-11 NOTE — BEHAVIORAL HEALTH ASSESSMENT NOTE - NSBHCHARTREVIEWINVESTIGATE_PSY_A_CORE FT
Ventricular Rate 96 BPM    Atrial Rate 96 BPM    P-R Interval 158 ms    QRS Duration 88 ms     ms    QTc 424 ms    P Axis 64 degrees    R Axis 73 degrees    T Axis 43 degrees    Diagnosis Line NORMAL SINUS RHYTHM  NORMAL ECG
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
## 2017-05-11 NOTE — H&P ADULT - GASTROINTESTINAL DETAILS
no rigidity/no rebound tenderness/no guarding/soft/no distention/normal/no masses palpable/bowel sounds normal/nontender

## 2017-05-11 NOTE — ED PROVIDER NOTE - MEDICAL DECISION MAKING DETAILS
40 y/o F with syncopal episode and decreased appetite/weakness x several days. Will check labs, give fluids, Zofran and re-assess.

## 2017-05-11 NOTE — H&P ADULT - NEGATIVE GASTROINTESTINAL SYMPTOMS
no change in bowel habits/no hematochezia/no melena/no jaundice/no diarrhea/no abdominal pain/no flatulence/no steatorrhea
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
## 2017-05-11 NOTE — ED PROVIDER NOTE - NS ED MD SCRIBE ATTENDING SCRIBE SECTIONS
REVIEW OF SYSTEMS/HIV/PAST MEDICAL/SURGICAL/SOCIAL HISTORY/HISTORY OF PRESENT ILLNESS/DISPOSITION/VITAL SIGNS( Pullset)/PHYSICAL EXAM
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
## 2017-05-11 NOTE — H&P ADULT - PROBLEM SELECTOR PLAN 4
Active smoker counselled extensively and interested in quitting. Nicotine patch offered and accepted

## 2017-05-11 NOTE — H&P ADULT - ATTENDING COMMENTS
Patient seen/evaluated at bedside. I agree with the resident progess note/outlined plan of care. My independent findings conclusions are documented.    Care assumed from another provider. Chart details reviewed.  Briefly, this is a 42 y/o female with pmhx of bipolar disorder, prior suicidal ideation on antidepressants, GERD endorses history of 4 ulcers and Schaztki's rings previously requiring balloon dilatation approx 2 years ago p/w syncopal episode and retching/nausea/anorexia. Patient state, she was in a stooped position in the bathroom when she suddenly syncopized. She denied prodrome of lightheadedness, visual dimming/ diaphoresis prior to this episode. Patient denies recent travel, lower extremity swelling/calf tenderness. Denies chest pain, burning chest discomfort or sensation of food sticking in her throat. States she is currently constipated x 3 days though her last bowel movement was a diarrheal BM. No melena, bright red blood per rectum. Patient had recent increase in prozac to 20mg po q day with continuation of her outpt xanax 1 mg tid regimen by her outpatient psychiatrist.    Patient ate a banana without difficulty on arrival to the medical floor    She was seen by inpatient psychiatry--> who did not recommend continuation of 1:1 safety observer    PE: as per HPI--> essentially unremarkable AAOx3 NAD, non-toxic appearing  (+) subjective flank and suprapubic pain, non tender on exam    Urinalysis: negative    1. syncope  2. dehydration  3. nausea/vomiting  4. history of GI ulcer (unknown location)  5. history of Shatzki's ring  6. major depressive disorder    Of note, patient had a CT abd- angio (March 2017): which was w/out acute process (pt reports she doesn't remember why it was done)  appears to have been dehydrated  -symptoms could be secondary to   -check xray abdomen, 2 view  -appreciate psych input: no evidence   - Patient seen/evaluated at bedside. I agree with the resident progess note/outlined plan of care. My independent findings conclusions are documented.    Care assumed from another provider. Chart details reviewed.  Briefly, this is a 40 y/o female with pmhx of bipolar disorder, prior suicidal ideation on antidepressants, GERD endorses history of 4 ulcers and Schaztki's rings previously requiring balloon dilatation approx 2 years ago p/w syncopal episode and retching/nausea/anorexia. Patient state, she was in a stooped position in the bathroom when she suddenly syncopized. She denied prodrome of lightheadedness, visual dimming/ diaphoresis prior to this episode. Patient denies recent travel, lower extremity swelling/calf tenderness. Denies chest pain, burning chest discomfort or sensation of food sticking in her throat. States she is currently constipated x 3 days though her last bowel movement was a diarrheal BM. No melena, bright red blood per rectum. Patient had recent increase in prozac to 20mg po q day with continuation of her outpt xanax 1 mg tid regimen by her outpatient psychiatrist.    Patient ate a banana without difficulty on arrival to the medical floor    She was seen by inpatient psychiatry--> who did not recommend continuation of 1:1 safety observer    PE: as per HPI--> essentially unremarkable AAOx3 NAD, non-toxic appearing  (+) subjective flank and suprapubic pain, non tender on exam    Urinalysis: negative    1. syncope  2. dehydration  3. nausea/vomiting  4. history of GI ulcer (unknown location)  5. history of Shatzki's ring  6. major depressive disorder    Of note, patient had a CT abd- angio (March 2017): which was w/out acute process (pt reports she doesn't remember why it was done)  appears to have been dehydrated, c/w IVF hydration  -TTE--> discussed with echo tech who will perform echo this evening  -symptoms could be secondary to primary gi process, however, has multiple other complaints in varying organ systems that may suggest component of anxiety as well  -reviewed case with Gi service, plan for likely EGD tomorrow, pending TTE  -check xray abdomen, 2 view  -appreciate psych input: no evidence of suicidal ideation  -patient's urine toxicology is positive for her prescription drugs, except for THC  -24 hour notice to be given today--> plan of care discussed with patient/daughter Patient seen/evaluated at bedside. I agree with the resident progess note/outlined plan of care. My independent findings conclusions are documented.    Care assumed from another provider. Chart details reviewed.  Briefly, this is a 40 y/o female with pmhx of bipolar disorder, prior suicidal ideation on antidepressants, GERD endorses history of 4 ulcers and Schaztki's rings previously requiring balloon dilatation approx 2 years ago p/w syncopal episode and retching/nausea/anorexia. Patient state, she was in a stooped position in the bathroom when she suddenly syncopized. She denied prodrome of lightheadedness, visual dimming/ diaphoresis prior to this episode. Patient denies recent travel, lower extremity swelling/calf tenderness. Denies chest pain, burning chest discomfort or sensation of food sticking in her throat. States she is currently constipated x 3 days though her last bowel movement was a diarrheal BM. No melena, bright red blood per rectum. Patient had recent increase in prozac to 20mg po q day with continuation of her outpt xanax 1 mg tid regimen by her outpatient psychiatrist.    Patient ate a banana without difficulty on arrival to the medical floor    She was seen by inpatient psychiatry--> who did not recommend continuation of 1:1 safety observer    PE: as per HPI--> essentially unremarkable AAOx3 NAD, non-toxic appearing  (+) subjective flank and suprapubic pain, non tender on exam    Urinalysis: negative    1. syncope  2. dehydration  3. nausea/vomiting  4. history of GI ulcer (unknown location)  5. history of Shatzki's ring  6. major depressive disorder  7. continuous tobacco use disorder    Of note, patient had a CT abd- angio (March 2017): which was w/out acute process (pt reports she doesn't remember why it was done)  appears to have been dehydrated, c/w IVF hydration  -TTE--> discussed with echo tech who will perform echo this evening  -telemetry was discontinued earlier today  -symptoms could be secondary to primary gi process, however, has multiple other complaints in varying organ systems that may suggest component of anxiety as well  -reviewed case with Gi service, plan for likely EGD tomorrow, pending TTE  -check xray abdomen, 2 view  -appreciate psych input: no evidence of suicidal ideation  -patient's urine toxicology is positive for her prescription drugs, except for THC  -smoking cessation counseling/nicotine patch  -24 hour notice to be given today--> plan of care discussed with patient/daughter
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
## 2017-05-11 NOTE — ED PROVIDER NOTE - OBJECTIVE STATEMENT
40 y/o F with PMHx of anorexia, anxiety, back pain, bipolar disorder, brain tumor presents to ED c/o syncopal episode today. Pt was found in her bathroom by her  who woke her up. She does not remember anything prior to waking up on the floor, last remembers going to the bathroom. Pt notes decreased appetite in past couple of weeks, describes aversion to food and feeling of nausea. She has not seen her PMD in this time. Denies SI, HI or any other complaints. Pt has a history of a CVA about six weeks ago, for which she is on aspirin. Pt also takes Prozac.

## 2017-05-11 NOTE — H&P ADULT - NEGATIVE NEUROLOGICAL SYMPTOMS
no focal seizures/no hemiparesis/no paresthesias/no facial palsy/no transient paralysis/no tremors/no loss of consciousness/no weakness/no loss of sensation/no headache/no confusion/no difficulty walking/no vertigo/no generalized seizures

## 2017-05-11 NOTE — H&P ADULT - PROBLEM SELECTOR PLAN 2
Constant observation as per psychiatrist patient is actively suicidal 3 weeks ago  Psych consult for possible transfer to inpatient psychiatric facility   Will continue home psych meds trazodone and Xanax

## 2017-05-11 NOTE — ED ADULT TRIAGE NOTE - CHIEF COMPLAINT QUOTE
pt states " my  found me passed out in the bathroom 20 minutes ago" pt fully a&ox3, ambulating steadily, breathing normally, no distress, pt states she's been having episodes of nausea and vomiting x 2 weeks and feels dehydrated

## 2017-05-11 NOTE — ED ADULT NURSE NOTE - OBJECTIVE STATEMENT
As per pt, "My  found me blacked out int he bathroom. I think it's because I haven't eaten food or drank any water for the past 2 weeks. I would always throw it up."
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
## 2017-05-11 NOTE — DISCHARGE NOTE ADULT - CARE PLAN
Principal Discharge DX:	Syncope  Goal:	Rule out of cardiac cause  Instructions for follow-up, activity and diet:	Your echocardiogram (ECHO) and electrocardiogram (ECG) were good. Follow up with your primary doctor within 1 week after discharge.  Secondary Diagnosis:	Gastroparesis  Goal:	Symptomatic relief  Instructions for follow-up, activity and diet:	Take regaling as prescribed. Follow up with Dr. Brush in about a week after discharge.  Secondary Diagnosis:	Depression  Goal:	symptomatic relief  Instructions for follow-up, activity and diet:	Continue your current medication regimen (prozac) and continue regular follow up with your primary psychiatrist. Principal Discharge DX:	Syncope  Goal:	Rule out of cardiac cause  Instructions for follow-up, activity and diet:	Your echocardiogram (ECHO) and electrocardiogram (ECG) were good. Follow up with your primary doctor within 1 week after discharge.  Secondary Diagnosis:	Gastroparesis  Goal:	Symptomatic relief  Instructions for follow-up, activity and diet:	Take regaling as prescribed. Follow up with Dr. Brush in about a week after discharge.  Secondary Diagnosis:	Depression  Goal:	symptomatic relief  Instructions for follow-up, activity and diet:	Continue your current medication regimen (prozac) and continue regular follow up with your primary psychiatrist.  Secondary Diagnosis:	Smoker  Goal:	Cessation Principal Discharge DX:	Syncope  Goal:	Rule out of cardiac cause  Instructions for follow-up, activity and diet:	Your echocardiogram (ECHO) and electrocardiogram (ECG) were good. Follow up with your primary doctor within 1 week after discharge.  Secondary Diagnosis:	Gastroparesis  Goal:	Symptomatic relief  Instructions for follow-up, activity and diet:	Take reglan as prescribed (10mg pill, 1 pill three times a day). Follow up with Dr. Brush in about a week after discharge.  Secondary Diagnosis:	Depression  Goal:	symptomatic relief  Instructions for follow-up, activity and diet:	Continue your current medication regimen (prozac) and continue regular follow up with your primary psychiatrist.  Secondary Diagnosis:	Smoker  Goal:	Cessation  Instructions for follow-up, activity and diet:	-nicotine patch has been prescribed. Follow up with your primary doctor. Principal Discharge DX:	Syncope  Goal:	Rule out of cardiac cause  Instructions for follow-up, activity and diet:	Your echocardiogram (ECHO) and electrocardiogram (ECG) were good. Follow up with your primary doctor within 1 week after discharge.  Secondary Diagnosis:	Gastroparesis  Goal:	Symptomatic relief  Instructions for follow-up, activity and diet:	Take reglan as prescribed (10mg pill, 1 pill three times a day). Follow up with Dr. Brush in about a week after discharge. *Note: reglan taken in conjunction with prozac may be a risk for extra-pyramidal symptoms (such as tardive dyskinesia). Patient may take medication but should follow up with primary doctor and primary psychiatrist within 1 week of discharge.  Secondary Diagnosis:	Depression  Goal:	symptomatic relief  Instructions for follow-up, activity and diet:	Continue your current medication regimen (prozac) and continue regular follow up with your primary psychiatrist.  Secondary Diagnosis:	Smoker  Goal:	Cessation  Instructions for follow-up, activity and diet:	-nicotine patch has been prescribed. Follow up with your primary doctor.

## 2017-05-11 NOTE — H&P ADULT - MUSCULOSKELETAL
details… detailed exam no calf tenderness/no joint warmth/no joint erythema/no joint swelling/normal strength/ROM intact/normal

## 2017-05-11 NOTE — DISCHARGE NOTE ADULT - PLAN OF CARE
symptomatic relief Continue your current medication regimen (prozac) and continue regular follow up with your primary psychiatrist. Symptomatic relief Take regaling as prescribed. Follow up with Dr. Brush in about a week after discharge. Rule out of cardiac cause Your echocardiogram (ECHO) and electrocardiogram (ECG) were good. Follow up with your primary doctor within 1 week after discharge. Cessation -nicotine patch has been prescribed. Follow up with your primary doctor. Take reglan as prescribed (10mg pill, 1 pill three times a day). Follow up with Dr. Brush in about a week after discharge. Take reglan as prescribed (10mg pill, 1 pill three times a day). Follow up with Dr. Brush in about a week after discharge. *Note: reglan taken in conjunction with prozac may be a risk for extra-pyramidal symptoms (such as tardive dyskinesia). Patient may take medication but should follow up with primary doctor and primary psychiatrist within 1 week of discharge.

## 2017-05-11 NOTE — DISCHARGE NOTE ADULT - PATIENT PORTAL LINK FT
“You can access the Follow[facility] Patient Portal, offered by [facility], by registering with the following website: [email or web address]”

## 2017-05-11 NOTE — DISCHARGE NOTE ADULT - CARE PROVIDER_API CALL
Segundo Ashley), Gastroenterology; Internal Medicine  [address] [address] [place] [address]  Phone: [phone]  Fax: [phone]

## 2017-05-11 NOTE — H&P ADULT - NEGATIVE MUSCULOSKELETAL SYMPTOMS
no myalgia/no arthritis/no muscle weakness/no arthralgia/no muscle cramps/no neck pain/no joint swelling

## 2017-05-12 ENCOUNTER — RESULT REVIEW (OUTPATIENT)
Age: 42
End: 2017-05-12

## 2017-05-12 VITALS
OXYGEN SATURATION: 100 % | TEMPERATURE: 98 F | SYSTOLIC BLOOD PRESSURE: 122 MMHG | HEART RATE: 81 BPM | RESPIRATION RATE: 18 BRPM | DIASTOLIC BLOOD PRESSURE: 67 MMHG

## 2017-05-12 DIAGNOSIS — R63.0 ANOREXIA: ICD-10-CM

## 2017-05-12 DIAGNOSIS — R63.4 ABNORMAL WEIGHT LOSS: ICD-10-CM

## 2017-05-12 DIAGNOSIS — R11.0 NAUSEA: ICD-10-CM

## 2017-05-12 LAB
BASOPHILS # BLD AUTO: 0.1 K/UL — SIGNIFICANT CHANGE UP (ref 0–0.2)
BASOPHILS NFR BLD AUTO: 1 % — SIGNIFICANT CHANGE UP (ref 0–2)
EOSINOPHIL # BLD AUTO: 0.2 K/UL — SIGNIFICANT CHANGE UP (ref 0–0.5)
EOSINOPHIL NFR BLD AUTO: 2.6 % — SIGNIFICANT CHANGE UP (ref 0–6)
HBA1C BLD-MCNC: 5.1 % — SIGNIFICANT CHANGE UP (ref 4–5.6)
HCT VFR BLD CALC: 38.4 % — SIGNIFICANT CHANGE UP (ref 34.5–45)
HGB BLD-MCNC: 12.6 G/DL — SIGNIFICANT CHANGE UP (ref 11.5–15.5)
LYMPHOCYTES # BLD AUTO: 3.1 K/UL — SIGNIFICANT CHANGE UP (ref 1–3.3)
LYMPHOCYTES # BLD AUTO: 40.8 % — SIGNIFICANT CHANGE UP (ref 13–44)
MAGNESIUM SERPL-MCNC: 2.1 MG/DL — SIGNIFICANT CHANGE UP (ref 1.6–2.6)
MCHC RBC-ENTMCNC: 30.5 PG — SIGNIFICANT CHANGE UP (ref 27–34)
MCHC RBC-ENTMCNC: 32.6 GM/DL — SIGNIFICANT CHANGE UP (ref 32–36)
MCV RBC AUTO: 93.3 FL — SIGNIFICANT CHANGE UP (ref 80–100)
MONOCYTES # BLD AUTO: 0.5 K/UL — SIGNIFICANT CHANGE UP (ref 0–0.9)
MONOCYTES NFR BLD AUTO: 6.1 % — SIGNIFICANT CHANGE UP (ref 2–14)
NEUTROPHILS # BLD AUTO: 3.8 K/UL — SIGNIFICANT CHANGE UP (ref 1.8–7.4)
NEUTROPHILS NFR BLD AUTO: 49.6 % — SIGNIFICANT CHANGE UP (ref 43–77)
PHOSPHATE SERPL-MCNC: 2.8 MG/DL — SIGNIFICANT CHANGE UP (ref 2.5–4.5)
PLATELET # BLD AUTO: 248 K/UL — SIGNIFICANT CHANGE UP (ref 150–400)
RBC # BLD: 4.12 M/UL — SIGNIFICANT CHANGE UP (ref 3.8–5.2)
RBC # FLD: 11.7 % — SIGNIFICANT CHANGE UP (ref 10.3–14.5)
TSH SERPL-MCNC: 1.47 UU/ML — SIGNIFICANT CHANGE UP (ref 0.34–4.82)
VIT B12 SERPL-MCNC: 838 PG/ML — SIGNIFICANT CHANGE UP (ref 243–894)
WBC # BLD: 7.6 K/UL — SIGNIFICANT CHANGE UP (ref 3.8–10.5)
WBC # FLD AUTO: 7.6 K/UL — SIGNIFICANT CHANGE UP (ref 3.8–10.5)

## 2017-05-12 PROCEDURE — 80053 COMPREHEN METABOLIC PANEL: CPT

## 2017-05-12 PROCEDURE — 99239 HOSP IP/OBS DSCHRG MGMT >30: CPT

## 2017-05-12 PROCEDURE — 82553 CREATINE MB FRACTION: CPT

## 2017-05-12 PROCEDURE — 85027 COMPLETE CBC AUTOMATED: CPT

## 2017-05-12 PROCEDURE — 88312 SPECIAL STAINS GROUP 1: CPT

## 2017-05-12 PROCEDURE — 84484 ASSAY OF TROPONIN QUANT: CPT

## 2017-05-12 PROCEDURE — 99285 EMERGENCY DEPT VISIT HI MDM: CPT | Mod: 25

## 2017-05-12 PROCEDURE — 80061 LIPID PANEL: CPT

## 2017-05-12 PROCEDURE — 43239 EGD BIOPSY SINGLE/MULTIPLE: CPT

## 2017-05-12 PROCEDURE — 84100 ASSAY OF PHOSPHORUS: CPT

## 2017-05-12 PROCEDURE — 88305 TISSUE EXAM BY PATHOLOGIST: CPT | Mod: 26

## 2017-05-12 PROCEDURE — 74020: CPT | Mod: 26

## 2017-05-12 PROCEDURE — 83735 ASSAY OF MAGNESIUM: CPT

## 2017-05-12 PROCEDURE — 93005 ELECTROCARDIOGRAM TRACING: CPT

## 2017-05-12 PROCEDURE — 84443 ASSAY THYROID STIM HORMONE: CPT

## 2017-05-12 PROCEDURE — 71045 X-RAY EXAM CHEST 1 VIEW: CPT

## 2017-05-12 PROCEDURE — 82550 ASSAY OF CK (CPK): CPT

## 2017-05-12 PROCEDURE — 83036 HEMOGLOBIN GLYCOSYLATED A1C: CPT

## 2017-05-12 PROCEDURE — 81001 URINALYSIS AUTO W/SCOPE: CPT

## 2017-05-12 PROCEDURE — 83690 ASSAY OF LIPASE: CPT

## 2017-05-12 PROCEDURE — 82746 ASSAY OF FOLIC ACID SERUM: CPT

## 2017-05-12 PROCEDURE — 80307 DRUG TEST PRSMV CHEM ANLYZR: CPT

## 2017-05-12 PROCEDURE — 74020: CPT

## 2017-05-12 PROCEDURE — 70450 CT HEAD/BRAIN W/O DYE: CPT

## 2017-05-12 PROCEDURE — 82607 VITAMIN B-12: CPT

## 2017-05-12 PROCEDURE — 81025 URINE PREGNANCY TEST: CPT

## 2017-05-12 PROCEDURE — 88312 SPECIAL STAINS GROUP 1: CPT | Mod: 26

## 2017-05-12 PROCEDURE — 93306 TTE W/DOPPLER COMPLETE: CPT

## 2017-05-12 PROCEDURE — 96374 THER/PROPH/DIAG INJ IV PUSH: CPT | Mod: 59

## 2017-05-12 PROCEDURE — 88305 TISSUE EXAM BY PATHOLOGIST: CPT

## 2017-05-12 RX ORDER — NICOTINE POLACRILEX 2 MG
1 GUM BUCCAL
Qty: 1 | Refills: 0 | OUTPATIENT
Start: 2017-05-12 | End: 2017-06-11

## 2017-05-12 RX ORDER — METOCLOPRAMIDE HCL 10 MG
1 TABLET ORAL
Qty: 45 | Refills: 0 | OUTPATIENT
Start: 2017-05-12 | End: 2017-05-27

## 2017-05-12 RX ORDER — NICOTINE POLACRILEX 2 MG
1 GUM BUCCAL
Qty: 2 | Refills: 0 | OUTPATIENT
Start: 2017-05-12 | End: 2017-06-11

## 2017-05-12 RX ADMIN — Medication 1 PATCH: at 13:11

## 2017-05-12 RX ADMIN — OXYCODONE HYDROCHLORIDE 5 MILLIGRAM(S): 5 TABLET ORAL at 16:36

## 2017-05-12 RX ADMIN — Medication 1 MILLIGRAM(S): at 09:39

## 2017-05-12 RX ADMIN — OXYCODONE HYDROCHLORIDE 5 MILLIGRAM(S): 5 TABLET ORAL at 09:39

## 2017-05-12 RX ADMIN — Medication 20 MILLIGRAM(S): at 13:11

## 2017-05-12 NOTE — PROGRESS NOTE ADULT - ATTENDING COMMENTS
1. syncope  2. dehydration  3. nausea/vomiting  4. history of GI ulcer (unknown location)  5. history of Shatzki's ring  6. major depressive disorder  7. continuous tobacco use disorder    Of note, patient had a CT abd- angio (March 2017): which was w/out acute process (pt reports she doesn't remember why it was done)  appears to have been dehydrated,  -responded well to  IVF hydration  -TTE--> w/out acute process, EF 55%  -s/p EGD revealing mild gastritis, gastroparesis--> dc with reglan 10mg tid x 14 days, will be re-assessed by GI service  -appreciate psych input: no evidence of suicidal ideation, social work intervention complete, outpt psychiatry follow-up arranged  -patient's urine toxicology is positive for her prescription drugs, except for THC  -smoking cessation counseling/nicotine patch  -patient's questions answered with plan for discharge today ASSESSMENT AND PLAN    1. syncope  2. dehydration  3. nausea/vomiting  4. history of GI ulcer (unknown location)  5. history of Shatzki's ring  6. major depressive disorder  7. continuous tobacco use disorder    Of note, patient had a CT abd- angio (March 2017): which was w/out acute process (pt reports she doesn't remember why it was done)  appears to have been dehydrated,  -responded well to  IVF hydration  -TTE--> w/out acute process, EF 55%  -s/p EGD revealing mild gastritis, gastroparesis--> dc with reglan 10mg tid x 14 days, will be re-assessed by GI service  -appreciate psych input: no evidence of suicidal ideation, social work intervention complete, outpt psychiatry follow-up arranged  -patient's urine toxicology is positive for her prescription drugs, except for THC  -smoking cessation counseling/nicotine patch  -patient's questions answered with plan for discharge today

## 2017-05-12 NOTE — DIETITIAN INITIAL EVALUATION ADULT. - FACTORS AFF FOOD INTAKE
persistent constipation/pain/Abdominal pain, retching, H/O Psychiatric Disorder, substance abuse/vomiting/persistent lack of appetite/other (specify)/persistent nausea

## 2017-05-12 NOTE — PROGRESS NOTE ADULT - SUBJECTIVE AND OBJECTIVE BOX
Patient is a 41y old  Female who presents with a chief complaint of vague complaints including apparent syncopal episode and decreased appetite (      INTERVAL HPI/OVERNIGHT EVENTS:  Subsequent history taking revealed h/o PUD (4 ulcers in the past) and Shatzki's rings requiring balloon dilation. Pt underwent TTE yesterday w/out acute findings and sent for EGD revealing mild gastritis, and retained food products suggestive of gastroparesis. Abd xray w/out acute process. Patient recommended for a trial of reglan x 2 weeks. Findings and plan of care discussed with patient at bedside who agreed. Instructions given to look out for extrapyramidal symptoms. Patient has been eating soft textured food without difficulty, no vomiting. reports some mild abdominal bloating.     Vital Signs Last 24 Hrs  T(C): 36.7, Max: 36.9 (-12 @ 00:20)  T(F): 98, Max: 98.5 (05- @ 00:20)  HR: 81 (61 - 83)  BP: 122/67 (100/61 - 122/67)  BP(mean): --  RR: 18 (18 - 18)  SpO2: 100% (100% - 100%)  I&O's Summary    I & Os for current day (as of 12 May 2017 20:10)  =============================================  IN: 1470 ml / OUT: 575 ml / NET: 895 ml      LABS:                        12.6   7.6   )-----------( 248      ( 12 May 2017 07:26 )             38.4     05-11    146<H>  |  111<H>  |  7   ----------------------------<  78  3.7   |  28  |  0.64    Ca    8.4      11 May 2017 10:18  Phos  2.8     05-  Mg     2.1     -12    TPro  6.1  /  Alb  3.1<L>  /  TBili  0.2  /  DBili  x   /  AST  15  /  ALT  21  /  AlkPhos  47  05-11      Urinalysis Basic - ( 11 May 2017 02:27 )    Color: Yellow / Appearance: Clear / S.010 / pH: x  Gluc: x / Ketone: Negative  / Bili: Negative / Urobili: Negative   Blood: x / Protein: Negative / Nitrite: Negative   Leuk Esterase: Negative / RBC: 5-10 /HPF / WBC 0-2 /HPF   Sq Epi: x / Non Sq Epi: Few / Bacteria: x      CAPILLARY BLOOD GLUCOSE    MEDICATIONS  (STANDING):  FLUoxetine 20milliGRAM(s) Oral daily  sodium chloride 0.9%. 1000milliLiter(s) IV Continuous <Continuous>  nicotine -  14 mG/24Hr(s) Patch 1Patch Transdermal daily  pantoprazole    Tablet 40milliGRAM(s) Oral before breakfast      MEDICATIONS  (PRN):  ALPRAZolam 1milliGRAM(s) Oral three times a day PRN agitation  ondansetron    Tablet 4milliGRAM(s) Oral every 8 hours PRN Nausea and/or Vomiting  oxyCODONE IR 5milliGRAM(s) Oral every 6 hours PRN Severe Pain (7 - 10)      Urinalysis Basic - ( 11 May 2017 02:27 )    Color: Yellow / Appearance: Clear / S.010 / pH: x  Gluc: x / Ketone: Negative  / Bili: Negative / Urobili: Negative   Blood: x / Protein: Negative / Nitrite: Negative   Leuk Esterase: Negative / RBC: 5-10 /HPF / WBC 0-2 /HPF   Sq Epi: x / Non Sq Epi: Few / Bacteria: x    REVIEW OF SYSTEMS:  CONSTITUTIONAL: + 10 lb weight loss over 1 month   RESPIRATORY: No cough, wheezing, chills or hemoptysis; No shortness of breath  CARDIOVASCULAR: No chest pain, palpitations, dizziness, or leg swelling  GASTROINTESTINAL: occasional abdominal pain, + nausea/ no  vomiting today, but has in the past, or hematemesis; + constipation--> last bm was diarrheal bowel movement three days ago. No melena or hematochezia.  GENITOURINARY: No dysuria, frequency, hematuria, or incontinence  NEUROLOGICAL:+ migraines, memory loss, loss of strength, numbness, or tremors  SKIN: No itching, burning, rashes, or lesions   MUSCULOSKELETAL: + occasional back pain  PSYCHIATRIC: No depression, anxiety, mood swings, or difficulty sleeping  HEME/LYMPH: No easy bruising, or bleeding gums      RADIOLOGY & ADDITIONAL TESTS:    Imaging Personally Reviewed:  [ x] YES  [ ] NO, abd xray and egd studies    Consultant(s) Notes Reviewed:  [x YES  [ ] NO    subramanian [  ], ngt [  ], peg [  ], et tube [  ], cent line [  ], picc line [  ] DVT ppx [  ]      PHYSICAL EXAM:  GENERAL: Comfortable, no acute distress , comfortably eating  HEAD:  Normocephalic, atraumatic  NECK: Supple, No JVD  NERVOUS SYSTEM:  Alert & Oriented X3, Motor Strength 5/5 B/L upper and lower extremities  CHEST/LUNG: Clear to auscultation bilaterally  HEART: Regular rate and rhythm  ABDOMEN: Soft, Nontender, Nondistended, Bowel sounds present, no guarding rebound  EXTREMITIES:   No clubbing, cyanosis, or edema, no calf tenderness  MUSCULOSKELTAL- No muscle tenderness, no joint tenderness  SKIN-no rash    Care Discussed with Consultants/Other Providers [x ] YES  [ ] NO

## 2017-05-12 NOTE — DIETITIAN INITIAL EVALUATION ADULT. - OTHER INFO
Nutrition consult requested for anorexia/bulimia. Patient from home & lives with Fiance. S/p EGD on 5/12/17. Visited pt. reports appetite poor x 15days with nausea/vomiting, retching & diarrhea x 4 days PTA also complaints of constipation, stated  >6 months & lost 10 lbs within 2wks. In house complaints of abdominal pain but nausea/vomiting resolved  & consuming 50% of meals, obtained food preferences, willing to try oral supplement with meals. Pt. presently not candidate for diet/nutrition education, followed by PSYCH, skin intact.

## 2017-05-12 NOTE — CONSULT NOTE ADULT - PROBLEM SELECTOR RECOMMENDATION 9
-Continue PPI daily   -NPO   EGD today   -Further recommendations pending EGD results  ____________________________________________    [  EGD/ Colonoscopy: The risks, benefits, and alternatives were explained in detail to the patient. Risks including but not limited to bleeding, perforation, adverse reaction to medications, cardiopulmonary compromise and their attendant sequalae explained.  Sequelae including but not limited to need for surgery, colostomy, prolonged hospital stay, placement of drainage tubes, blood transfusions, disability, morbidity and mortality was explained. He/she indicated understanding of the above and wishes to proceed.  All questions and concerns have been addressed.

## 2017-05-12 NOTE — CHART NOTE - NSCHARTNOTEFT_GEN_A_CORE
Upon Nutritional Assessment by the Registered Dietitian your patient was determined to meet criteria / has evidence of the following diagnosis/diagnoses:          [ ]  Mild Protein Calorie Malnutrition        [ ]  Moderate Protein Calorie Malnutrition        [ X] Severe Protein Calorie Malnutrition        [ ] Unspecified Protein Calorie Malnutrition        [ ] Underweight / BMI <19        [ ] Morbid Obesity / BMI > 40      Findings as based on:  •  Comprehensive nutrition assessment and consultation  •  Calorie counts (nutrient intake analysis)  •  Food acceptance and intake status from observations by staff  •  Follow up  •  Patient education  •  Intervention secondary to interdisciplinary rounds  •   concerns      Treatment:    The following diet has been recommended:      PROVIDER Section:     By signing this assessment you are acknowledging and agree with the diagnosis/diagnoses assigned by the Registered Dietitian    Comments:  Add Ensure Enlive 1 can TID (1050Kcal, 60 grams Protein) & Multivitamins/minerals to diet Rx, as medically feasible.

## 2017-05-12 NOTE — DIETITIAN INITIAL EVALUATION ADULT. - MD RECOMMEND
Advance diet with nutrition supplement, or may consider alternate nutrition support, If NPO prolonged, as medically feasible/po supplement

## 2017-05-12 NOTE — CONSULT NOTE ADULT - SUBJECTIVE AND OBJECTIVE BOX
Patient is a 41y old  Female who presents with a chief complaint of vague complaints including apparent syncopal episode and decreased appetite (11 May 2017 12:09)    HPI: 41y Female  presents with a chief complaint of  syncope.  The patient ahs a signficant hsitory of bipolar diaroder     Allergies    sulfa drugs (Other)  sulfa drugs (Unknown)  sulfADIAZINE (Unknown)  Sulfur (Anaphylaxis)    Intolerances      MEDICATIONS  (STANDING):  FLUoxetine 20milliGRAM(s) Oral daily  sodium chloride 0.9%. 1000milliLiter(s) IV Continuous <Continuous>  nicotine -  14 mG/24Hr(s) Patch 1Patch Transdermal daily  pantoprazole    Tablet 40milliGRAM(s) Oral before breakfast  freetext medication  - controlled substance 30milliGRAM(s) Oral two times a day    MEDICATIONS  (PRN):  ALPRAZolam 1milliGRAM(s) Oral three times a day PRN agitation  ondansetron    Tablet 4milliGRAM(s) Oral every 8 hours PRN Nausea and/or Vomiting  oxyCODONE IR 5milliGRAM(s) Oral every 6 hours PRN Severe Pain (7 - 10)      PAST MEDICAL & SURGICAL HISTORY:  Back pain, unspecified back location, unspecified back pain laterality, unspecified chronicity  Cerebrovascular accident (CVA) due to other mechanism  Brain tumor  Smoker  Syncope and collapse  Bipolar disorder NOS  Anorexia/bolemia from age [age]  Lumbar Herniated Disc: intermittent b/l sciatica with R&gt;L  borderline mood disorder  Posttraumatic Stress Disorder: pt witnessed her father&#x27;s suicide at the age of 38 with a gun at the age of 5  Brother, age 38, committed suicide on the father&#x27;s anniversary of the father&#x27;s suicide on 9/10/09  Anxiety  Depression: multiple hospitalizations for attempted suicide---self committed herself to [facility] this past month.  Discharged 10/1/09  3/09 Seizure--etiology, abrupt withdrawal from benzodiazepines  No significant past surgical history   Termination of Pregnancy    FAMILY HISTORY:  No pertinent family history in first degree relatives    Social History: No hsitory of : Tobacco use, IVDA, EToH  ______________________________________________________________________________________  REVIEW OF SYSTEMS  Constitutional:   No fever, no fatigue, no pallor, no night sweats, no weight loss.  HEENT:   No eye pain, no vision changes, no icterus, no mouth ulcers.  Respiratory:   No shortness of breath, no cough, no respiratory distress.   Cardiovascular:   No chest pain, no palpitations.   Gastrointestinal: No abdominal pain, no nausea, no vomiting , no diahrrea, no constipation, no hematochezia,no melena.  Skin:   No rashes, no jaundice, no eczema.   Musculoskeletal:   No joint pain, no swelling, no myalgia.   Neurologic:   No headache, no seizure, no weakness.   Genitourinary:   No dysuria, no decreased urine output.  Psychiatric:  No depression, no anxiety,   Endocrine:   No thyroid disease, no diabetes.  Heme/Lymphatic:   No anemia, no blood transfusions, no lymph node enlargement, no bleeding, no bruising.  ___________________________________________________________________________________________  PHYSICAL EXAM    Daily     Daily Weight in k.3 (12 May 2017 05:11)  BMI: 23 ( @ 05:48)  Change in Weight:  Vital Signs Last 24 Hrs  T(C): 36.9, Max: 36.9 (- @ 00:20)  T(F): 98.5, Max: 98.5 (- @ 00:20)  HR: 83 (51 - 83)  BP: 105/61 (96/55 - 122/77)  BP(mean): --  RR: 18 (18 - 18)  SpO2: 100% (99% - 100%)    General:  Well developed, well nourished, alert and active, no pallor, NAD.  HEENT:    Normal appearance of conjunctiva, ears, nose, lips, oropharynx, and oral mucosa, anicteric.  Neck:  No masses, no asymmetry.  Lymph Nodes:  No lymphadenopathy.   Cardiovascular:  RRR normal S1/S2, no murmur.  Respiratory:  CTA B/L, normal respiratory effort.   Abdominal:   soft, no masses or tenderness, normoactive BS, NT/ND, no HSM.  Extremities:   No clubbing or cyanosis, normal capillary refill, no edema.   Skin:   No rash, jaundice, lesions, eczema.   Musculoskeletal:  No joint swelling, erythema or tenderness.   Neuro: No focal deficits.   Other:     Lab Results:                          12.6   7.6   )-----------( 248      ( 12 May 2017 07:26 )             38.4     05-11    146<H>  |  111<H>  |  7   ----------------------------<  78  3.7   |  28  |  0.64    Ca    8.4      11 May 2017 10:18  Phos  2.8     05-12  Mg     2.1     05-12    TPro  6.1  /  Alb  3.1<L>  /  TBili  0.2  /  DBili  x   /  AST  15  /  ALT  21  /  AlkPhos  47  05-11    LIVER FUNCTIONS - ( 11 May 2017 10:18 )  Alb: 3.1 g/dL / Pro: 6.1 g/dL / ALK PHOS: 47 U/L / ALT: 21 U/L DA / AST: 15 U/L / GGT: x               CARDIAC MARKERS ( 11 May 2017 10:18 )  <0.015 ng/mL / x     / 68 U/L / x     / 1.4 ng/mL  CARDIAC MARKERS ( 11 May 2017 02:09 )  <0.015 ng/mL / x     / x     / x     / x          Stool Results:          RADIOLOGY RESULTS:    SURGICAL PATHOLOGY: Patient is a 41y old  Female who presents with a chief complaint of vague complaints including apparent syncopal episode and decreased appetite (11 May 2017 12:09)    HPI: 41y Female  presents with a chief complaint of  syncope.  The patient has  a significant history of bipolar disorder with suicide attempts in the  past. She states that she also has a history of a Schatzki's ring that untimely need dilation (done by Dr. Martel). Additionally she states that she has a significant history of PUD ( was taking NSAIDs at the time). She states taht for the past two weeks shes has been experiencing anorexia and nausea everytime she ate. SHe states that because of decreased PO intake she has become weak and that's why she believes she collapsed yesterday. She denies dysphagia, vomiting, melena hematochezia. She does report about a ten pound weight loss.       REVIEW OF SYSTEMS  Constitutional:   No fever, + weight loss + fatigue   HEENT:   No eye pain, no vision changes, no icterus, no mouth ulcers.  Respiratory:   No shortness of breath, no cough, no respiratory distress.   Cardiovascular:   No chest pain, no palpitations.   Gastrointestinal: No abdominal pain, no nausea, no vomiting , no diarrhea no constipation, no hematochezia, no melena.  Skin:   No rashes, no jaundice, no eczema.   Musculoskeletal:   No joint pain, no swelling, no myalgia.   Neurologic:   No headache, no seizure, + weakness.   Genitourinary:   No dysuria, no decreased urine output.  Psychiatric:+ depression   Endocrine:   No thyroid disease, no diabetes.  Heme/Lymphatic:   No anemia, no blood transfusions, no lymph node enlargement, no bleeding, no bruising.  ___________________________________________________________________________________________      Allergies    sulfa drugs (Other) (? bleeding gums)       MEDICATIONS  (STANDING):  FLUoxetine 20milliGRAM(s) Oral daily  sodium chloride 0.9%. 1000milliLiter(s) IV Continuous <Continuous>  nicotine -  14 mG/24Hr(s) Patch 1Patch Transdermal daily  pantoprazole    Tablet 40milliGRAM(s) Oral before breakfast  freetext medication  - controlled substance 30milliGRAM(s) Oral two times a day    MEDICATIONS  (PRN):  ALPRAZolam 1milliGRAM(s) Oral three times a day PRN agitation  ondansetron    Tablet 4milliGRAM(s) Oral every 8 hours PRN Nausea and/or Vomiting  oxyCODONE IR 5milliGRAM(s) Oral every 6 hours PRN Severe Pain (7 - 10)      PAST MEDICAL & SURGICAL HISTORY:  Back pain, unspecified back location, unspecified back pain laterality, unspecified chronicity  Cerebrovascular accident (CVA) due to other mechanism  Brain tumor  Smoker  Syncope and collapse  Bipolar disorder NOS  Anorexia/bolemia from age [age]  Lumbar Herniated Disc: intermittent b/l sciatica with R&gt;L  borderline mood disorder  Posttraumatic Stress Disorder: pt witnessed her father&#x27;s suicide at the age of 38 with a gun at the age of 5  Brother, age 38, committed suicide on the father&#x27;s anniversary of the father&#x27;s suicide on 9/10/09  Anxiety  Depression: multiple hospitalizations for attempted suicide---self committed herself to [facility] this past month.  Discharged 10/1/09  3/09 Seizure--etiology, abrupt withdrawal from benzodiazepines  No significant past surgical history   Termination of Pregnancy    FAMILY HISTORY:  No pertinent family history in first degree relatives    Social History: No history of :  IVDA, EToH + Tobacc[employer] use    ______________________________________________________________________________________    PHYSICAL EXAM    Daily     Daily Weight in k.3 (12 May 2017 05:11)  BMI: 23 ( @ 05:48)  Change in Weight:  Vital Signs Last 24 Hrs  T(C): 36.9, Max: 36.9 ( @ 00:20)  T(F): 98.5, Max: 98.5 (05-12 @ 00:20)  HR: 83 (51 - 83)  BP: 105/61 (96/55 - 122/77)  BP(mean): --  RR: 18 (18 - 18)  SpO2: 100% (99% - 100%)    General:  Well developed, well nourished, alert and active, no pallor, NAD.  HEENT:    Normal appearance of conjunctiva, ears, nose, lips, oropharynx, and oral mucosa, anicteric.  Neck:  No masses, no asymmetry.  Cardiovascular:  RRR normal S1/S2, no murmur.  Respiratory:  CTA B/L, normal respiratory effort.   Abdominal:   soft, no masses or tenderness, normoactive BS, NT/ND, no HSM.  Extremities:   No clubbing or cyanosis, normal capillary refill, no edema.   Skin:   No rash, jaundice, lesions, eczema.   Musculoskeletal:  No joint swelling, erythema or tenderness.   Neuro: No focal deficits.       Lab Results:                          12.6   7.6   )-----------( 248      ( 12 May 2017 07:26 )             38.4     05-11    146<H>  |  111<H>  |  7   ----------------------------<  78  3.7   |  28  |  0.64    Ca    8.4      11 May 2017 10:18  Phos  2.8     05-12  Mg     2.1     -12    TPro  6.1  /  Alb  3.1<L>  /  TBili  0.2  /  DBili  x   /  AST  15  /  ALT  21  /  AlkPhos  47  05-11    LIVER FUNCTIONS - ( 11 May 2017 10:18 )  Alb: 3.1 g/dL / Pro: 6.1 g/dL / ALK PHOS: 47 U/L / ALT: 21 U/L DA / AST: 15 U/L / GGT: x               CARDIAC MARKERS ( 11 May 2017 10:18 )  <0.015 ng/mL / x     / 68 U/L / x     / 1.4 ng/mL  CARDIAC MARKERS ( 11 May 2017 02:09 )  <0.015 ng/mL / x     / x     / x     / x          Stool Results:          RADIOLOGY RESULTS:  CT Head:   IMPRESSION:  No acute intracranial hemorrhage or acute territorial infarct.     Echo:   Other :CONCLUSIONS:  1. Minimal mitral regurgitation.  2. Normal left ventricular internal dimensions and wall  thicknesses.  3. Endocardium not well visualized; grossly normal left  ventricular function. Regional wall motion could not be  accurately assessed.  4. Normal right ventricular size and function.

## 2017-05-12 NOTE — DIETITIAN INITIAL EVALUATION ADULT. - NUTRITION INTERVENTION
Vitamin/Collaboration and Referral of Nutrition Care/Medical Food Supplements/Nutrition Education/Meals and Snack

## 2017-05-18 ENCOUNTER — APPOINTMENT (OUTPATIENT)
Dept: GASTROENTEROLOGY | Facility: CLINIC | Age: 42
End: 2017-05-18

## 2017-05-22 DIAGNOSIS — R45.851 SUICIDAL IDEATIONS: ICD-10-CM

## 2017-05-22 DIAGNOSIS — K31.84 GASTROPARESIS: ICD-10-CM

## 2017-05-22 DIAGNOSIS — F43.10 POST-TRAUMATIC STRESS DISORDER, UNSPECIFIED: ICD-10-CM

## 2017-05-22 DIAGNOSIS — R55 SYNCOPE AND COLLAPSE: ICD-10-CM

## 2017-05-22 DIAGNOSIS — K22.2 ESOPHAGEAL OBSTRUCTION: ICD-10-CM

## 2017-05-22 DIAGNOSIS — Z86.73 PERSONAL HISTORY OF TRANSIENT ISCHEMIC ATTACK (TIA), AND CEREBRAL INFARCTION WITHOUT RESIDUAL DEFICITS: ICD-10-CM

## 2017-05-22 DIAGNOSIS — F41.9 ANXIETY DISORDER, UNSPECIFIED: ICD-10-CM

## 2017-05-22 DIAGNOSIS — E43 UNSPECIFIED SEVERE PROTEIN-CALORIE MALNUTRITION: ICD-10-CM

## 2017-05-22 DIAGNOSIS — R63.0 ANOREXIA: ICD-10-CM

## 2017-05-22 DIAGNOSIS — F17.210 NICOTINE DEPENDENCE, CIGARETTES, UNCOMPLICATED: ICD-10-CM

## 2017-05-22 DIAGNOSIS — Z85.841 PERSONAL HISTORY OF MALIGNANT NEOPLASM OF BRAIN: ICD-10-CM

## 2017-05-22 DIAGNOSIS — F12.90 CANNABIS USE, UNSPECIFIED, UNCOMPLICATED: ICD-10-CM

## 2017-05-22 DIAGNOSIS — K29.70 GASTRITIS, UNSPECIFIED, WITHOUT BLEEDING: ICD-10-CM

## 2017-05-22 DIAGNOSIS — F31.9 BIPOLAR DISORDER, UNSPECIFIED: ICD-10-CM

## 2017-05-22 DIAGNOSIS — Z86.69 PERSONAL HISTORY OF OTHER DISEASES OF THE NERVOUS SYSTEM AND SENSE ORGANS: ICD-10-CM

## 2017-08-04 ENCOUNTER — EMERGENCY (EMERGENCY)
Facility: HOSPITAL | Age: 42
LOS: 1 days | Discharge: LEFT BEFORE DISCHARGE | End: 2017-08-04
Attending: EMERGENCY MEDICINE | Admitting: EMERGENCY MEDICINE
Payer: MEDICARE

## 2017-08-04 VITALS
OXYGEN SATURATION: 99 % | SYSTOLIC BLOOD PRESSURE: 124 MMHG | DIASTOLIC BLOOD PRESSURE: 81 MMHG | TEMPERATURE: 98 F | RESPIRATION RATE: 18 BRPM | HEART RATE: 70 BPM

## 2017-08-04 VITALS
DIASTOLIC BLOOD PRESSURE: 89 MMHG | HEART RATE: 80 BPM | TEMPERATURE: 99 F | OXYGEN SATURATION: 99 % | RESPIRATION RATE: 20 BRPM | SYSTOLIC BLOOD PRESSURE: 136 MMHG

## 2017-08-04 PROCEDURE — 99284 EMERGENCY DEPT VISIT MOD MDM: CPT

## 2017-08-04 NOTE — ED PROVIDER NOTE - PMH
3/09 Seizure--etiology, abrupt withdrawal from benzodiazepines    Anorexia/bolemia from age [age]    Anxiety    Back pain, unspecified back location, unspecified back pain laterality, unspecified chronicity    Bipolar disorder NOS    borderline mood disorder    Brain tumor    Cerebrovascular accident (CVA) due to other mechanism    Depression  multiple hospitalizations for attempted suicide---self committed herself to [facility] this past month.  Discharged 10/1/09  Lumbar Herniated Disc  intermittent b/l sciatica with R>L  Posttraumatic Stress Disorder  pt witnessed her father's suicide at the age of 38 with a gun at the age of 5  Brother, age 38, committed suicide on the father's anniversary of the father's suicide on 9/10/09  Smoker    Stroke  1 month ago  Syncope and collapse

## 2017-08-04 NOTE — ED PROVIDER NOTE - PHYSICAL EXAMINATION
Physical Exam:   Gen: NAD, non-toxic, conversational  Eyes: Pupils ~2mm and minimally reactive to light, EOMI   HEENT: Normocephalic, atraumatic. External ears normal, no rhinorrhea, moist mucous membranes.   CV: RRR, no M/R/G  Resp: CTAB, non-labored, speaking without difficulty on room air  Abd: soft, non tender, non rigid, no guarding or rebound tenderness  Skin: dry, wwp   Neuro: AOx3, speech is fluent and appropriate, patient with intermittent left sided facial droop, Strength is 5/5 in RUE/RLE and 3/5 in LUE/LLE on exam, patient is able to gait normally, able to walk to restroom.   Psych: Affect euthymic Physical Exam:   Gen: NAD, non-toxic, conversational  Eyes: Pupils ~2mm and minimally reactive to light, EOMI   HEENT: Normocephalic, atraumatic. External ears normal, no rhinorrhea, moist mucous membranes.   CV: RRR, no M/R/G  Resp: CTAB, non-labored, speaking without difficulty on room air  Abd: soft, non tender, non rigid, no guarding or rebound tenderness  Skin: dry, wwp   Neuro: AOx3, speech is fluent and appropriate, patient with intermittent left sided facial droop, Strength is 5/5 in RUE/RLE and 3/5 in LUE/LLE on exam, patient is able to gait normally, able to walk to restroom. CN2-12 grossly intact.   Psych: Affect euthymic

## 2017-08-04 NOTE — ED PROVIDER NOTE - ATTENDING CONTRIBUTION TO CARE
41 year old female with psychiatric history, claiming to have confusion and L arm weakness. pt having no L arm weakness to me, neurology exam consistant with certain words w poor recall (boyfriend name) otherwise completely fluid. suspect pt malingering no s/s of cva, will ct head. pt not risk to self. pt will leave ama.

## 2017-08-04 NOTE — ED ADULT NURSE NOTE - PMH
3/09 Seizure--etiology, abrupt withdrawal from benzodiazepines    Anorexia/bolemia from age [age]    Anxiety    Back pain, unspecified back location, unspecified back pain laterality, unspecified chronicity    Bipolar disorder NOS    borderline mood disorder    Brain tumor    Cerebrovascular accident (CVA) due to other mechanism    Depression  multiple hospitalizations for attempted suicide---self committed herself to [facility] this past month.  Discharged 10/1/09  Lumbar Herniated Disc  intermittent b/l sciatica with R>L  Posttraumatic Stress Disorder  pt witnessed her father's suicide at the age of 39 with a gun at the age of 5  Brother, age 38, committed suicide on the father's anniversary of the father's suicide on 9/10/09  Smoker    Stroke  1 month ago  Syncope and collapse

## 2017-08-04 NOTE — ED PROVIDER NOTE - PROGRESS NOTE DETAILS
pt decided she wanted to smoke in parking lot, explained that she cannot do that with IV in place, request it taken out, pt has capacity to leave, iv removed by tiffani. pt has no si/hi, will not sedate pt and after attempt to convince pt to stay, she departed with her boyfriend -Almas Borrego MD-

## 2017-08-04 NOTE — ED PROVIDER NOTE - OBJECTIVE STATEMENT
The patient reports that over the past hour she has developed difficulty word finding as well as left sided weakness in her arm and leg. she has a history of a known pineal mass and states that it has "grown 2 cm" in the past few months. Due to this she has an abnormal neurologic exam at baseline, but states that her left sided weakness is new. She denies recent drug use, stating that she only takes her medications as prescribed. Of note, she also states that she is no longer sure who the person with her is (her fiance); he states that this is new and has not happened before.     History obtained from the patient and her fiance. Of note, the patient's fiance reports that over the past month and a half the patient has begun to abuse her opioid medications and been getting into more arguments with family and friends.

## 2017-08-04 NOTE — ED PROVIDER NOTE - MEDICAL DECISION MAKING DETAILS
see attg 40yo F presenting with complaint of fatigue and left sided weakness. Ddx includes stroke, peripheral palsy, infectious, and DOA. Physical examination reveals left sided weakness initially, however patient is able to walk and move freely at other times. Patient to CT and returns well and is able to ambulate, reports feeling improved, and requesting to be allowed to the parking lot for cigarettes, at which time discussion was had over the need to remove IV if this was the case. Patient reports dissatisfaction with this, and I informed her that I would much prefer she stay for the results of the CT scan, however after a discussion of potential risks of leaving, patient does ultimately decide to leave AMA. NCHCT returns with no signs of acute abnormality.     see attg

## 2017-08-05 LAB
ALBUMIN SERPL ELPH-MCNC: 4.4 G/DL — SIGNIFICANT CHANGE UP (ref 3.3–5)
ALP SERPL-CCNC: 50 U/L — SIGNIFICANT CHANGE UP (ref 40–120)
ALT FLD-CCNC: 17 U/L RC — SIGNIFICANT CHANGE UP (ref 10–45)
ANION GAP SERPL CALC-SCNC: 12 MMOL/L — SIGNIFICANT CHANGE UP (ref 5–17)
APAP SERPL-MCNC: <15 UG/ML — SIGNIFICANT CHANGE UP (ref 10–30)
APPEARANCE UR: CLEAR — SIGNIFICANT CHANGE UP
AST SERPL-CCNC: 21 U/L — SIGNIFICANT CHANGE UP (ref 10–40)
BASE EXCESS BLDV CALC-SCNC: 1.5 MMOL/L — SIGNIFICANT CHANGE UP (ref -2–2)
BASOPHILS # BLD AUTO: 0 K/UL — SIGNIFICANT CHANGE UP (ref 0–0.2)
BASOPHILS NFR BLD AUTO: 0.6 % — SIGNIFICANT CHANGE UP (ref 0–2)
BILIRUB SERPL-MCNC: 0.2 MG/DL — SIGNIFICANT CHANGE UP (ref 0.2–1.2)
BILIRUB UR-MCNC: NEGATIVE — SIGNIFICANT CHANGE UP
BUN SERPL-MCNC: 11 MG/DL — SIGNIFICANT CHANGE UP (ref 7–23)
CA-I SERPL-SCNC: 1.2 MMOL/L — SIGNIFICANT CHANGE UP (ref 1.12–1.3)
CALCIUM SERPL-MCNC: 9.3 MG/DL — SIGNIFICANT CHANGE UP (ref 8.4–10.5)
CHLORIDE BLDV-SCNC: 109 MMOL/L — HIGH (ref 96–108)
CHLORIDE SERPL-SCNC: 106 MMOL/L — SIGNIFICANT CHANGE UP (ref 96–108)
CO2 BLDV-SCNC: 29 MMOL/L — SIGNIFICANT CHANGE UP (ref 22–30)
CO2 SERPL-SCNC: 25 MMOL/L — SIGNIFICANT CHANGE UP (ref 22–31)
COLOR SPEC: YELLOW — SIGNIFICANT CHANGE UP
CREAT SERPL-MCNC: 0.72 MG/DL — SIGNIFICANT CHANGE UP (ref 0.5–1.3)
DIFF PNL FLD: ABNORMAL
EOSINOPHIL # BLD AUTO: 0 K/UL — SIGNIFICANT CHANGE UP (ref 0–0.5)
EOSINOPHIL NFR BLD AUTO: 0.6 % — SIGNIFICANT CHANGE UP (ref 0–6)
EPI CELLS # UR: SIGNIFICANT CHANGE UP /HPF
GAS PNL BLDV: 137 MMOL/L — SIGNIFICANT CHANGE UP (ref 136–145)
GAS PNL BLDV: SIGNIFICANT CHANGE UP
GAS PNL BLDV: SIGNIFICANT CHANGE UP
GLUCOSE BLDV-MCNC: 89 MG/DL — SIGNIFICANT CHANGE UP (ref 70–99)
GLUCOSE SERPL-MCNC: 89 MG/DL — SIGNIFICANT CHANGE UP (ref 70–99)
GLUCOSE UR QL: NEGATIVE — SIGNIFICANT CHANGE UP
HCG UR QL: NEGATIVE — SIGNIFICANT CHANGE UP
HCO3 BLDV-SCNC: 27 MMOL/L — SIGNIFICANT CHANGE UP (ref 21–29)
HCT VFR BLD CALC: 36.8 % — SIGNIFICANT CHANGE UP (ref 34.5–45)
HCT VFR BLDA CALC: 39 % — SIGNIFICANT CHANGE UP (ref 39–50)
HGB BLD CALC-MCNC: 12.8 G/DL — SIGNIFICANT CHANGE UP (ref 11.5–15.5)
HGB BLD-MCNC: 12.9 G/DL — SIGNIFICANT CHANGE UP (ref 11.5–15.5)
INR BLD: 1.08 RATIO — SIGNIFICANT CHANGE UP (ref 0.88–1.16)
KETONES UR-MCNC: NEGATIVE — SIGNIFICANT CHANGE UP
LACTATE BLDV-MCNC: 0.9 MMOL/L — SIGNIFICANT CHANGE UP (ref 0.7–2)
LEUKOCYTE ESTERASE UR-ACNC: NEGATIVE — SIGNIFICANT CHANGE UP
LYMPHOCYTES # BLD AUTO: 2 K/UL — SIGNIFICANT CHANGE UP (ref 1–3.3)
LYMPHOCYTES # BLD AUTO: 27.8 % — SIGNIFICANT CHANGE UP (ref 13–44)
MCHC RBC-ENTMCNC: 33.6 PG — SIGNIFICANT CHANGE UP (ref 27–34)
MCHC RBC-ENTMCNC: 35 GM/DL — SIGNIFICANT CHANGE UP (ref 32–36)
MCV RBC AUTO: 95.9 FL — SIGNIFICANT CHANGE UP (ref 80–100)
MONOCYTES # BLD AUTO: 0.5 K/UL — SIGNIFICANT CHANGE UP (ref 0–0.9)
MONOCYTES NFR BLD AUTO: 6.3 % — SIGNIFICANT CHANGE UP (ref 2–14)
NEUTROPHILS # BLD AUTO: 4.7 K/UL — SIGNIFICANT CHANGE UP (ref 1.8–7.4)
NEUTROPHILS NFR BLD AUTO: 64.6 % — SIGNIFICANT CHANGE UP (ref 43–77)
NITRITE UR-MCNC: NEGATIVE — SIGNIFICANT CHANGE UP
PCO2 BLDV: 50 MMHG — SIGNIFICANT CHANGE UP (ref 35–50)
PH BLDV: 7.35 — SIGNIFICANT CHANGE UP (ref 7.35–7.45)
PH UR: 7 — SIGNIFICANT CHANGE UP (ref 5–8)
PLATELET # BLD AUTO: 261 K/UL — SIGNIFICANT CHANGE UP (ref 150–400)
PO2 BLDV: 28 MMHG — SIGNIFICANT CHANGE UP (ref 25–45)
POTASSIUM BLDV-SCNC: 3.3 MMOL/L — LOW (ref 3.5–5)
POTASSIUM SERPL-MCNC: 3.6 MMOL/L — SIGNIFICANT CHANGE UP (ref 3.5–5.3)
POTASSIUM SERPL-SCNC: 3.6 MMOL/L — SIGNIFICANT CHANGE UP (ref 3.5–5.3)
PROT SERPL-MCNC: 7.4 G/DL — SIGNIFICANT CHANGE UP (ref 6–8.3)
PROT UR-MCNC: NEGATIVE — SIGNIFICANT CHANGE UP
PROTHROM AB SERPL-ACNC: 11.8 SEC — SIGNIFICANT CHANGE UP (ref 9.8–12.7)
RBC # BLD: 3.84 M/UL — SIGNIFICANT CHANGE UP (ref 3.8–5.2)
RBC # FLD: 11.3 % — SIGNIFICANT CHANGE UP (ref 10.3–14.5)
RBC CASTS # UR COMP ASSIST: SIGNIFICANT CHANGE UP /HPF (ref 0–2)
SALICYLATES SERPL-MCNC: 2.4 MG/DL — LOW (ref 15–30)
SAO2 % BLDV: 57 % — LOW (ref 67–88)
SODIUM SERPL-SCNC: 143 MMOL/L — SIGNIFICANT CHANGE UP (ref 135–145)
SP GR SPEC: 1.01 — SIGNIFICANT CHANGE UP (ref 1.01–1.02)
UROBILINOGEN FLD QL: NEGATIVE — SIGNIFICANT CHANGE UP
WBC # BLD: 7.3 K/UL — SIGNIFICANT CHANGE UP (ref 3.8–10.5)
WBC # FLD AUTO: 7.3 K/UL — SIGNIFICANT CHANGE UP (ref 3.8–10.5)
WBC UR QL: SIGNIFICANT CHANGE UP /HPF (ref 0–5)

## 2017-08-05 PROCEDURE — 81025 URINE PREGNANCY TEST: CPT

## 2017-08-05 PROCEDURE — 85014 HEMATOCRIT: CPT

## 2017-08-05 PROCEDURE — 99284 EMERGENCY DEPT VISIT MOD MDM: CPT | Mod: 25

## 2017-08-05 PROCEDURE — 82803 BLOOD GASES ANY COMBINATION: CPT

## 2017-08-05 PROCEDURE — 84295 ASSAY OF SERUM SODIUM: CPT

## 2017-08-05 PROCEDURE — 82435 ASSAY OF BLOOD CHLORIDE: CPT

## 2017-08-05 PROCEDURE — 80053 COMPREHEN METABOLIC PANEL: CPT

## 2017-08-05 PROCEDURE — 83605 ASSAY OF LACTIC ACID: CPT

## 2017-08-05 PROCEDURE — 85027 COMPLETE CBC AUTOMATED: CPT

## 2017-08-05 PROCEDURE — 70450 CT HEAD/BRAIN W/O DYE: CPT | Mod: 26

## 2017-08-05 PROCEDURE — 85610 PROTHROMBIN TIME: CPT

## 2017-08-05 PROCEDURE — 82947 ASSAY GLUCOSE BLOOD QUANT: CPT

## 2017-08-05 PROCEDURE — 81001 URINALYSIS AUTO W/SCOPE: CPT

## 2017-08-05 PROCEDURE — 82330 ASSAY OF CALCIUM: CPT

## 2017-08-05 PROCEDURE — 84132 ASSAY OF SERUM POTASSIUM: CPT

## 2017-08-05 PROCEDURE — 70450 CT HEAD/BRAIN W/O DYE: CPT

## 2017-08-05 PROCEDURE — 80307 DRUG TEST PRSMV CHEM ANLYZR: CPT

## 2017-08-05 NOTE — ED ADULT NURSE REASSESSMENT NOTE - NS ED NURSE REASSESS COMMENT FT1
0035- Seen patient & rios walking out of the ED together "I need to smoke outside" Made aware about smoke free hospital. Asked patient to come back. patient has heplock on. Dr JASON Borrego made aware & made patient understand about leaving AMA. Heplock removed as requested by patient & reassessed by MD. Leaving AMA discussed & explained risks of leaving. Patient stated to MD "Zak not signing any paper" MD to patient & in front of Rios. Afterwards patient & rios walked out.
2330- On arrival patient was ambulatory. Awake & alert oriented to self & stated the reason why she came to the hospital. Accompanied by a friend who introduced himself as her "fiancee". Patient keep saying "I don't know him" While obtaining information patient more clearly speaking & without any hesitation gave all the names of meds that she was currently taking, Including her medical history. Walked to the bathroom in a fast pace & steady gait.

## 2017-08-25 ENCOUNTER — EMERGENCY (EMERGENCY)
Facility: HOSPITAL | Age: 42
LOS: 1 days | Discharge: HOME | End: 2017-08-25
Attending: EMERGENCY MEDICINE | Admitting: EMERGENCY MEDICINE
Payer: MEDICARE

## 2017-08-25 VITALS
SYSTOLIC BLOOD PRESSURE: 128 MMHG | DIASTOLIC BLOOD PRESSURE: 81 MMHG | OXYGEN SATURATION: 97 % | TEMPERATURE: 98 F | HEART RATE: 81 BPM | RESPIRATION RATE: 20 BRPM

## 2017-08-25 VITALS
OXYGEN SATURATION: 98 % | SYSTOLIC BLOOD PRESSURE: 123 MMHG | TEMPERATURE: 98 F | HEART RATE: 105 BPM | RESPIRATION RATE: 20 BRPM | DIASTOLIC BLOOD PRESSURE: 75 MMHG

## 2017-08-25 PROCEDURE — 99284 EMERGENCY DEPT VISIT MOD MDM: CPT | Mod: 25

## 2017-08-25 PROCEDURE — 99284 EMERGENCY DEPT VISIT MOD MDM: CPT

## 2017-08-25 RX ORDER — PENICILLIN V POTASSIUM 250 MG
1 TABLET ORAL
Qty: 21 | Refills: 0 | OUTPATIENT
Start: 2017-08-25 | End: 2017-09-01

## 2017-08-25 RX ORDER — PENICILLIN V POTASSIUM 250 MG
500 TABLET ORAL ONCE
Qty: 0 | Refills: 0 | Status: COMPLETED | OUTPATIENT
Start: 2017-08-25 | End: 2017-08-25

## 2017-08-25 RX ADMIN — Medication 500 MILLIGRAM(S): at 21:22

## 2017-08-25 NOTE — ED ADULT NURSE NOTE - OBJECTIVE STATEMENT
41F comes to ED c/o dental pain. She states "I broke my tooth 4 hours ago". She has pain to jaw, left ear and down left side of neck. She is a&ox4 with patent airway. PMH PTSD, anxiety, CVA. She does not appear to be in any distress. Denies SOB/chest pain/fever/chills/abdominal pain. States last took oxycodone 9 hours ago. On exam, soft nontender abdomen, no edema, slight facial droop on left side residual from prior CVA, +pulse/motor/sensory all 4 extremities. Will continue to monitor.

## 2017-08-25 NOTE — ED PROVIDER NOTE - OBJECTIVE STATEMENT
40yo F pmh as below presents for evaluation of left lower dental pain s/p fracture while eating today. Denies other trauma. Denies other medical complaint. Took oxycodone 30mg today already.

## 2017-08-25 NOTE — ED PROVIDER NOTE - ATTENDING CONTRIBUTION TO CARE
Attending MD Macdonald:   I personally have seen and examined this patient.  Physician assistant note reviewed and agree on plan of care and except where noted.  See HPI, PE, and MDM for details.

## 2017-08-25 NOTE — ED PROVIDER NOTE - PHYSICAL EXAMINATION
Attending MD Macdonald: A & O x 3, NAD: EOMI b/l, PERRL b/l, tooth #21 with significant caries, post OP wnl, no trismus, uvula midline, tongue not elevated, neck supple, neuro nonofocal, affect appropriate

## 2017-08-25 NOTE — ED PROVIDER NOTE - PMH
3/09 Seizure--etiology, abrupt withdrawal from benzodiazepines    Anorexia/bolemia from age [age]    Anxiety    Back pain, unspecified back location, unspecified back pain laterality, unspecified chronicity    Bipolar disorder NOS    borderline mood disorder    Brain tumor    Cerebrovascular accident (CVA) due to other mechanism    Depression  multiple hospitalizations for attempted suicide---self committed herself to [facility] this past month.  Discharged 10/1/09  Lumbar Herniated Disc  intermittent b/l sciatica with R>L  Posttraumatic Stress Disorder  pt witnessed her father's suicide at the age of 38 with a gun at the age of 5  Brother, age 38, committed suicide on the father's anniversary of the father's suicide on 9/10/09  Smoker    Stroke  1 month ago  Syncope and collapse

## 2017-08-25 NOTE — ED PROVIDER NOTE - CARE PLAN
Principal Discharge DX:	Dental injury, initial encounter Principal Discharge DX:	Dental injury, initial encounter  Instructions for follow-up, activity and diet:	1. Rest. Soft food diet. Avoid chewing on affected side.  2. Take antibiotics: Penicillin VK 500mg every 8 hours. Initial dose given in ED.  3. For pain: Take tylenol 650mg every 4-6 hours as needed for pain. Take motrin 600mg by mouth every 8 hours as needed for pain. Take with food.   4. Follow up with your dentist or our dental clinic tomorrow [phone]  5. Return to ER for new or worsened symptoms including pain, swelling, fever, tongue swelling, inability to swallow, or any concern. Principal Discharge DX:	Dental injury, initial encounter  Instructions for follow-up, activity and diet:	1. Rest. Soft food diet. Avoid chewing on affected side.  2. Take antibiotics: Penicillin VK 500mg every 8 hours. Initial dose given in ED.  3. For pain: Take tylenol 650mg every 4-6 hours as needed for pain. Take motrin 600mg by mouth every 8 hours as needed for pain. Take with food.   4. Follow up with your dentist or our dental clinic tomorrow [phone]  5. Return to ER for new or worsened symptoms including pain, swelling, fever, tongue swelling, inability to swallow, or any concern.

## 2017-08-25 NOTE — ED ADULT NURSE NOTE - PMH
3/09 Seizure--etiology, abrupt withdrawal from benzodiazepines    Anorexia/bolemia from age [age]    Anxiety    Back pain, unspecified back location, unspecified back pain laterality, unspecified chronicity    Bipolar disorder NOS    borderline mood disorder    Brain tumor    Cerebrovascular accident (CVA) due to other mechanism    Depression  multiple hospitalizations for attempted suicide---self committed herself to [facility] this past month.  Discharged 10/1/09  Lumbar Herniated Disc  intermittent b/l sciatica with R>L  Posttraumatic Stress Disorder  pt witnessed her father's suicide at the age of 39 with a gun at the age of 5  Brother, age 38, committed suicide on the father's anniversary of the father's suicide on 9/10/09  Smoker    Stroke  1 month ago  Syncope and collapse
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
## 2017-08-25 NOTE — ED PROVIDER NOTE - PROGRESS NOTE DETAILS
Evaluated by dental, given block, feeling better. Will follow up with dental clinic tomorrow. Plan for discharge discussed with patient. All questions answered. Pt verbalizes agreement and understanding of plan. -Margaux Mora PA-C

## 2017-08-25 NOTE — ED PROVIDER NOTE - CONSTITUTIONAL, MLM
normal... Disheveled appearing, well nourished, awake, alert, oriented to person, place, time/situation and in no apparent distress.

## 2017-08-25 NOTE — ED PROVIDER NOTE - MEDICAL DECISION MAKING DETAILS
Attending MD Macdonald: 41F with poor dentition presenting with Ashford III fracture of tooth 21, plan for dental consult, PO abx

## 2017-08-25 NOTE — ED PROVIDER NOTE - PLAN OF CARE
1. Rest. Soft food diet. Avoid chewing on affected side.  2. Take antibiotics: Penicillin VK 500mg every 8 hours. Initial dose given in ED.  3. For pain: Take tylenol 650mg every 4-6 hours as needed for pain. Take motrin 600mg by mouth every 8 hours as needed for pain. Take with food.   4. Follow up with your dentist or our dental clinic tomorrow [phone]  5. Return to ER for new or worsened symptoms including pain, swelling, fever, tongue swelling, inability to swallow, or any concern.

## 2017-08-27 RX ORDER — PENICILLIN V POTASSIUM 250 MG
1 TABLET ORAL
Qty: 21 | Refills: 0 | OUTPATIENT
Start: 2017-08-27 | End: 2017-09-03

## 2017-09-07 ENCOUNTER — EMERGENCY (EMERGENCY)
Facility: HOSPITAL | Age: 42
LOS: 1 days | Discharge: HOME | End: 2017-09-07
Attending: EMERGENCY MEDICINE
Payer: MEDICARE

## 2017-09-07 VITALS
OXYGEN SATURATION: 99 % | SYSTOLIC BLOOD PRESSURE: 107 MMHG | RESPIRATION RATE: 16 BRPM | HEART RATE: 71 BPM | DIASTOLIC BLOOD PRESSURE: 59 MMHG | TEMPERATURE: 99 F

## 2017-09-07 VITALS
RESPIRATION RATE: 17 BRPM | HEIGHT: 64 IN | DIASTOLIC BLOOD PRESSURE: 90 MMHG | OXYGEN SATURATION: 98 % | TEMPERATURE: 99 F | WEIGHT: 113.1 LBS | HEART RATE: 78 BPM | SYSTOLIC BLOOD PRESSURE: 140 MMHG

## 2017-09-07 DIAGNOSIS — Z85.841 PERSONAL HISTORY OF MALIGNANT NEOPLASM OF BRAIN: ICD-10-CM

## 2017-09-07 DIAGNOSIS — R56.9 UNSPECIFIED CONVULSIONS: ICD-10-CM

## 2017-09-07 DIAGNOSIS — Z86.73 PERSONAL HISTORY OF TRANSIENT ISCHEMIC ATTACK (TIA), AND CEREBRAL INFARCTION WITHOUT RESIDUAL DEFICITS: ICD-10-CM

## 2017-09-07 DIAGNOSIS — Z88.2 ALLERGY STATUS TO SULFONAMIDES: ICD-10-CM

## 2017-09-07 LAB
ALBUMIN SERPL ELPH-MCNC: 3.6 G/DL — SIGNIFICANT CHANGE UP (ref 3.5–5)
ALP SERPL-CCNC: 45 U/L — SIGNIFICANT CHANGE UP (ref 40–120)
ALT FLD-CCNC: 22 U/L DA — SIGNIFICANT CHANGE UP (ref 10–60)
ANION GAP SERPL CALC-SCNC: 5 MMOL/L — SIGNIFICANT CHANGE UP (ref 5–17)
AST SERPL-CCNC: 19 U/L — SIGNIFICANT CHANGE UP (ref 10–40)
BASOPHILS # BLD AUTO: 0.1 K/UL — SIGNIFICANT CHANGE UP (ref 0–0.2)
BASOPHILS NFR BLD AUTO: 1.3 % — SIGNIFICANT CHANGE UP (ref 0–2)
BILIRUB SERPL-MCNC: 0.2 MG/DL — SIGNIFICANT CHANGE UP (ref 0.2–1.2)
BUN SERPL-MCNC: 10 MG/DL — SIGNIFICANT CHANGE UP (ref 7–18)
CALCIUM SERPL-MCNC: 8.9 MG/DL — SIGNIFICANT CHANGE UP (ref 8.4–10.5)
CHLORIDE SERPL-SCNC: 109 MMOL/L — HIGH (ref 96–108)
CO2 SERPL-SCNC: 28 MMOL/L — SIGNIFICANT CHANGE UP (ref 22–31)
CREAT SERPL-MCNC: 0.71 MG/DL — SIGNIFICANT CHANGE UP (ref 0.5–1.3)
EOSINOPHIL # BLD AUTO: 0.2 K/UL — SIGNIFICANT CHANGE UP (ref 0–0.5)
EOSINOPHIL NFR BLD AUTO: 1.9 % — SIGNIFICANT CHANGE UP (ref 0–6)
ETHANOL SERPL-MCNC: 3 MG/DL — SIGNIFICANT CHANGE UP (ref 0–10)
GLUCOSE SERPL-MCNC: 77 MG/DL — SIGNIFICANT CHANGE UP (ref 70–99)
HCT VFR BLD CALC: 35.1 % — SIGNIFICANT CHANGE UP (ref 34.5–45)
HGB BLD-MCNC: 11.8 G/DL — SIGNIFICANT CHANGE UP (ref 11.5–15.5)
LYMPHOCYTES # BLD AUTO: 4.5 K/UL — HIGH (ref 1–3.3)
LYMPHOCYTES # BLD AUTO: 51.1 % — HIGH (ref 13–44)
MCHC RBC-ENTMCNC: 31.6 PG — SIGNIFICANT CHANGE UP (ref 27–34)
MCHC RBC-ENTMCNC: 33.7 GM/DL — SIGNIFICANT CHANGE UP (ref 32–36)
MCV RBC AUTO: 93.6 FL — SIGNIFICANT CHANGE UP (ref 80–100)
MONOCYTES # BLD AUTO: 0.5 K/UL — SIGNIFICANT CHANGE UP (ref 0–0.9)
MONOCYTES NFR BLD AUTO: 6.1 % — SIGNIFICANT CHANGE UP (ref 2–14)
NEUTROPHILS # BLD AUTO: 3.5 K/UL — SIGNIFICANT CHANGE UP (ref 1.8–7.4)
NEUTROPHILS NFR BLD AUTO: 39.6 % — LOW (ref 43–77)
PLATELET # BLD AUTO: 241 K/UL — SIGNIFICANT CHANGE UP (ref 150–400)
POTASSIUM SERPL-MCNC: 3.5 MMOL/L — SIGNIFICANT CHANGE UP (ref 3.5–5.3)
POTASSIUM SERPL-SCNC: 3.5 MMOL/L — SIGNIFICANT CHANGE UP (ref 3.5–5.3)
PROT SERPL-MCNC: 6.7 G/DL — SIGNIFICANT CHANGE UP (ref 6–8.3)
RBC # BLD: 3.75 M/UL — LOW (ref 3.8–5.2)
RBC # FLD: 11.6 % — SIGNIFICANT CHANGE UP (ref 10.3–14.5)
SODIUM SERPL-SCNC: 142 MMOL/L — SIGNIFICANT CHANGE UP (ref 135–145)
WBC # BLD: 8.7 K/UL — SIGNIFICANT CHANGE UP (ref 3.8–10.5)
WBC # FLD AUTO: 8.7 K/UL — SIGNIFICANT CHANGE UP (ref 3.8–10.5)

## 2017-09-07 PROCEDURE — 80053 COMPREHEN METABOLIC PANEL: CPT

## 2017-09-07 PROCEDURE — 36416 COLLJ CAPILLARY BLOOD SPEC: CPT

## 2017-09-07 PROCEDURE — 96374 THER/PROPH/DIAG INJ IV PUSH: CPT

## 2017-09-07 PROCEDURE — 85027 COMPLETE CBC AUTOMATED: CPT

## 2017-09-07 PROCEDURE — 99285 EMERGENCY DEPT VISIT HI MDM: CPT | Mod: 25

## 2017-09-07 PROCEDURE — 99284 EMERGENCY DEPT VISIT MOD MDM: CPT | Mod: 25

## 2017-09-07 PROCEDURE — 80307 DRUG TEST PRSMV CHEM ANLYZR: CPT

## 2017-09-07 RX ORDER — SODIUM CHLORIDE 9 MG/ML
3 INJECTION INTRAMUSCULAR; INTRAVENOUS; SUBCUTANEOUS EVERY 8 HOURS
Qty: 0 | Refills: 0 | Status: DISCONTINUED | OUTPATIENT
Start: 2017-09-07 | End: 2017-09-11

## 2017-09-07 RX ORDER — LEVETIRACETAM 250 MG/1
1 TABLET, FILM COATED ORAL
Qty: 60 | Refills: 0 | OUTPATIENT
Start: 2017-09-07

## 2017-09-07 RX ORDER — LEVETIRACETAM 250 MG/1
1000 TABLET, FILM COATED ORAL ONCE
Qty: 0 | Refills: 0 | Status: COMPLETED | OUTPATIENT
Start: 2017-09-07 | End: 2017-09-07

## 2017-09-07 RX ADMIN — LEVETIRACETAM 400 MILLIGRAM(S): 250 TABLET, FILM COATED ORAL at 04:51

## 2017-09-07 NOTE — ED PROVIDER NOTE - PHYSICAL EXAMINATION
NEURO: Kernig and Brudzinski signs area negative, no petechiae, no photophobia, no dysarthria, no facial asymmetry, no focal deficits. GCS 15, no raccoon eyes, no Battles sign, no scalp step off deformities.
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
## 2017-09-07 NOTE — ED PROVIDER NOTE - MEDICAL DECISION MAKING DETAILS
6:33am- Pt remains seizure free, talkative, walking, no distress. Rx Keppra and pt given contact to f/u with neuro clinic. Pt is well appearing walking with normal gait, stable for discharge and follow up with medical doctor. Pt educated on care and need for follow up. Discussed anticipatory guidance and return precautions. Questions answered. I had a detailed discussion with the patient and/or guardian regarding the historical points, exam findings, and any diagnostic results supporting the discharge diagnosis.

## 2017-09-07 NOTE — ED PROVIDER NOTE - OBJECTIVE STATEMENT
42 y/o F w/ PMHx of seizures and extensive psychiatric history BIB EMS to ED after seizing. Per fiance, pt had a seizure lasting less than 5 minutes at 2300, and then again in the ER. Pt also had breakthrough seizure yesterday. Fiance states that pt has been non compliant w/ Dilantin for four months now. Denies any fever, head trauma, or any other complaints. Pt is alert & oriented x 3 in ED. NKDA.

## 2017-09-26 ENCOUNTER — OUTPATIENT (OUTPATIENT)
Dept: OUTPATIENT SERVICES | Facility: HOSPITAL | Age: 42
LOS: 1 days | Discharge: STILL ADMITTED OR ADMINISTRATIVE | End: 2017-09-26
Payer: MEDICARE

## 2017-09-26 PROCEDURE — 90792 PSYCH DIAG EVAL W/MED SRVCS: CPT

## 2017-09-27 DIAGNOSIS — F60.3 BORDERLINE PERSONALITY DISORDER: ICD-10-CM

## 2017-10-25 ENCOUNTER — APPOINTMENT (OUTPATIENT)
Dept: PULMONOLOGY | Facility: CLINIC | Age: 42
End: 2017-10-25

## 2018-02-12 ENCOUNTER — EMERGENCY (EMERGENCY)
Facility: HOSPITAL | Age: 43
LOS: 1 days | Discharge: LEFT BEFORE DISCHARGE | End: 2018-02-12
Attending: EMERGENCY MEDICINE
Payer: MEDICARE

## 2018-02-12 VITALS
DIASTOLIC BLOOD PRESSURE: 78 MMHG | WEIGHT: 125 LBS | RESPIRATION RATE: 20 BRPM | TEMPERATURE: 98 F | OXYGEN SATURATION: 100 % | HEART RATE: 104 BPM | SYSTOLIC BLOOD PRESSURE: 132 MMHG

## 2018-02-12 PROCEDURE — 93005 ELECTROCARDIOGRAM TRACING: CPT

## 2018-02-12 RX ORDER — SODIUM CHLORIDE 9 MG/ML
3 INJECTION INTRAMUSCULAR; INTRAVENOUS; SUBCUTANEOUS ONCE
Qty: 0 | Refills: 0 | Status: DISCONTINUED | OUTPATIENT
Start: 2018-02-12 | End: 2018-02-16

## 2018-02-12 NOTE — ED ADULT NURSE NOTE - EXPLANATION OF PATIENT'S REASON FOR LEAVING
pt called at her celphone and states she left because she has been waiting for a while. was told to come back to ed anytime for work up but pt refused ,states she is going to see doctor tomorrow.

## 2018-02-21 ENCOUNTER — APPOINTMENT (OUTPATIENT)
Dept: PULMONOLOGY | Facility: CLINIC | Age: 43
End: 2018-02-21

## 2018-03-18 ENCOUNTER — EMERGENCY (EMERGENCY)
Facility: HOSPITAL | Age: 43
LOS: 1 days | Discharge: HOME | End: 2018-03-18
Admitting: EMERGENCY MEDICINE
Payer: MEDICAID

## 2018-03-18 ENCOUNTER — EMERGENCY (EMERGENCY)
Facility: HOSPITAL | Age: 43
LOS: 1 days | Discharge: HOME | End: 2018-03-18
Attending: EMERGENCY MEDICINE | Admitting: EMERGENCY MEDICINE
Payer: MEDICARE

## 2018-03-18 VITALS
SYSTOLIC BLOOD PRESSURE: 138 MMHG | TEMPERATURE: 99 F | HEART RATE: 100 BPM | OXYGEN SATURATION: 100 % | DIASTOLIC BLOOD PRESSURE: 79 MMHG | RESPIRATION RATE: 18 BRPM

## 2018-03-18 VITALS
DIASTOLIC BLOOD PRESSURE: 75 MMHG | SYSTOLIC BLOOD PRESSURE: 136 MMHG | RESPIRATION RATE: 16 BRPM | OXYGEN SATURATION: 99 % | HEART RATE: 85 BPM

## 2018-03-18 VITALS
RESPIRATION RATE: 18 BRPM | HEART RATE: 91 BPM | OXYGEN SATURATION: 100 % | TEMPERATURE: 98 F | SYSTOLIC BLOOD PRESSURE: 134 MMHG | DIASTOLIC BLOOD PRESSURE: 73 MMHG

## 2018-03-18 DIAGNOSIS — F60.3 BORDERLINE PERSONALITY DISORDER: ICD-10-CM

## 2018-03-18 DIAGNOSIS — F43.22 ADJUSTMENT DISORDER WITH ANXIETY: ICD-10-CM

## 2018-03-18 LAB
ALBUMIN SERPL ELPH-MCNC: 4.8 G/DL — SIGNIFICANT CHANGE UP (ref 3.3–5)
ALP SERPL-CCNC: 51 U/L — SIGNIFICANT CHANGE UP (ref 40–120)
ALT FLD-CCNC: 20 U/L — SIGNIFICANT CHANGE UP (ref 4–33)
AMPHET UR-MCNC: POSITIVE — SIGNIFICANT CHANGE UP
APAP SERPL-MCNC: < 15 UG/ML — LOW (ref 15–25)
APPEARANCE UR: CLEAR — SIGNIFICANT CHANGE UP
AST SERPL-CCNC: 28 U/L — SIGNIFICANT CHANGE UP (ref 4–32)
BACTERIA # UR AUTO: SIGNIFICANT CHANGE UP
BARBITURATES MEASUREMENT: NEGATIVE — SIGNIFICANT CHANGE UP
BARBITURATES UR SCN-MCNC: NEGATIVE — SIGNIFICANT CHANGE UP
BASOPHILS # BLD AUTO: 0.06 K/UL — SIGNIFICANT CHANGE UP (ref 0–0.2)
BASOPHILS NFR BLD AUTO: 0.6 % — SIGNIFICANT CHANGE UP (ref 0–2)
BENZODIAZ SERPL-MCNC: POSITIVE — SIGNIFICANT CHANGE UP
BENZODIAZ UR-MCNC: POSITIVE — SIGNIFICANT CHANGE UP
BILIRUB SERPL-MCNC: 0.6 MG/DL — SIGNIFICANT CHANGE UP (ref 0.2–1.2)
BILIRUB UR-MCNC: NEGATIVE — SIGNIFICANT CHANGE UP
BLOOD UR QL VISUAL: NEGATIVE — SIGNIFICANT CHANGE UP
BUN SERPL-MCNC: 15 MG/DL — SIGNIFICANT CHANGE UP (ref 7–23)
CALCIUM SERPL-MCNC: 9.3 MG/DL — SIGNIFICANT CHANGE UP (ref 8.4–10.5)
CANNABINOIDS UR-MCNC: POSITIVE — SIGNIFICANT CHANGE UP
CHLORIDE SERPL-SCNC: 101 MMOL/L — SIGNIFICANT CHANGE UP (ref 98–107)
CO2 SERPL-SCNC: 23 MMOL/L — SIGNIFICANT CHANGE UP (ref 22–31)
COCAINE METAB.OTHER UR-MCNC: POSITIVE — SIGNIFICANT CHANGE UP
COLOR SPEC: YELLOW — SIGNIFICANT CHANGE UP
CREAT SERPL-MCNC: 0.87 MG/DL — SIGNIFICANT CHANGE UP (ref 0.5–1.3)
EOSINOPHIL # BLD AUTO: 0.13 K/UL — SIGNIFICANT CHANGE UP (ref 0–0.5)
EOSINOPHIL NFR BLD AUTO: 1.3 % — SIGNIFICANT CHANGE UP (ref 0–6)
ETHANOL BLD-MCNC: < 10 MG/DL — SIGNIFICANT CHANGE UP
GLUCOSE SERPL-MCNC: 79 MG/DL — SIGNIFICANT CHANGE UP (ref 70–99)
GLUCOSE UR-MCNC: NEGATIVE — SIGNIFICANT CHANGE UP
HCG SERPL-ACNC: < 5 MIU/ML — SIGNIFICANT CHANGE UP
HCT VFR BLD CALC: 40.5 % — SIGNIFICANT CHANGE UP (ref 34.5–45)
HGB BLD-MCNC: 13.5 G/DL — SIGNIFICANT CHANGE UP (ref 11.5–15.5)
HYALINE CASTS # UR AUTO: SIGNIFICANT CHANGE UP (ref 0–?)
IMM GRANULOCYTES # BLD AUTO: 0.04 # — SIGNIFICANT CHANGE UP
IMM GRANULOCYTES NFR BLD AUTO: 0.4 % — SIGNIFICANT CHANGE UP (ref 0–1.5)
KETONES UR-MCNC: NEGATIVE — SIGNIFICANT CHANGE UP
LEUKOCYTE ESTERASE UR-ACNC: NEGATIVE — SIGNIFICANT CHANGE UP
LYMPHOCYTES # BLD AUTO: 3.27 K/UL — SIGNIFICANT CHANGE UP (ref 1–3.3)
LYMPHOCYTES # BLD AUTO: 33.1 % — SIGNIFICANT CHANGE UP (ref 13–44)
MCHC RBC-ENTMCNC: 29.9 PG — SIGNIFICANT CHANGE UP (ref 27–34)
MCHC RBC-ENTMCNC: 33.3 % — SIGNIFICANT CHANGE UP (ref 32–36)
MCV RBC AUTO: 89.8 FL — SIGNIFICANT CHANGE UP (ref 80–100)
METHADONE UR-MCNC: NEGATIVE — SIGNIFICANT CHANGE UP
MONOCYTES # BLD AUTO: 0.82 K/UL — SIGNIFICANT CHANGE UP (ref 0–0.9)
MONOCYTES NFR BLD AUTO: 8.3 % — SIGNIFICANT CHANGE UP (ref 2–14)
MUCOUS THREADS # UR AUTO: SIGNIFICANT CHANGE UP
NEUTROPHILS # BLD AUTO: 5.56 K/UL — SIGNIFICANT CHANGE UP (ref 1.8–7.4)
NEUTROPHILS NFR BLD AUTO: 56.3 % — SIGNIFICANT CHANGE UP (ref 43–77)
NITRITE UR-MCNC: NEGATIVE — SIGNIFICANT CHANGE UP
NRBC # FLD: 0 — SIGNIFICANT CHANGE UP
OPIATES UR-MCNC: NEGATIVE — SIGNIFICANT CHANGE UP
OXYCODONE UR-MCNC: POSITIVE — HIGH
PCP UR-MCNC: NEGATIVE — SIGNIFICANT CHANGE UP
PH UR: 7 — SIGNIFICANT CHANGE UP (ref 4.6–8)
PLATELET # BLD AUTO: 333 K/UL — SIGNIFICANT CHANGE UP (ref 150–400)
PMV BLD: 10.9 FL — SIGNIFICANT CHANGE UP (ref 7–13)
POTASSIUM SERPL-MCNC: 3.4 MMOL/L — LOW (ref 3.5–5.3)
POTASSIUM SERPL-SCNC: 3.4 MMOL/L — LOW (ref 3.5–5.3)
PROT SERPL-MCNC: 8.2 G/DL — SIGNIFICANT CHANGE UP (ref 6–8.3)
PROT UR-MCNC: 20 MG/DL — SIGNIFICANT CHANGE UP
RBC # BLD: 4.51 M/UL — SIGNIFICANT CHANGE UP (ref 3.8–5.2)
RBC # FLD: 12.3 % — SIGNIFICANT CHANGE UP (ref 10.3–14.5)
RBC CASTS # UR COMP ASSIST: SIGNIFICANT CHANGE UP (ref 0–?)
SALICYLATES SERPL-MCNC: < 5 MG/DL — LOW (ref 15–30)
SODIUM SERPL-SCNC: 140 MMOL/L — SIGNIFICANT CHANGE UP (ref 135–145)
SP GR SPEC: 1.02 — SIGNIFICANT CHANGE UP (ref 1–1.04)
SQUAMOUS # UR AUTO: SIGNIFICANT CHANGE UP
TSH SERPL-MCNC: 1.38 UIU/ML — SIGNIFICANT CHANGE UP (ref 0.27–4.2)
UROBILINOGEN FLD QL: NORMAL MG/DL — SIGNIFICANT CHANGE UP
WBC # BLD: 9.88 K/UL — SIGNIFICANT CHANGE UP (ref 3.8–10.5)
WBC # FLD AUTO: 9.88 K/UL — SIGNIFICANT CHANGE UP (ref 3.8–10.5)
WBC UR QL: SIGNIFICANT CHANGE UP (ref 0–?)

## 2018-03-18 PROCEDURE — 99284 EMERGENCY DEPT VISIT MOD MDM: CPT

## 2018-03-18 PROCEDURE — 99283 EMERGENCY DEPT VISIT LOW MDM: CPT

## 2018-03-18 PROCEDURE — 90792 PSYCH DIAG EVAL W/MED SRVCS: CPT | Mod: GC

## 2018-03-18 RX ORDER — OXYCODONE HYDROCHLORIDE 5 MG/1
30 TABLET ORAL ONCE
Qty: 0 | Refills: 0 | Status: DISCONTINUED | OUTPATIENT
Start: 2018-03-18 | End: 2018-03-18

## 2018-03-18 RX ORDER — ALPRAZOLAM 0.25 MG
1 TABLET ORAL ONCE
Qty: 0 | Refills: 0 | Status: DISCONTINUED | OUTPATIENT
Start: 2018-03-18 | End: 2018-03-18

## 2018-03-18 RX ADMIN — Medication 1 MILLIGRAM(S): at 22:54

## 2018-03-18 RX ADMIN — OXYCODONE HYDROCHLORIDE 30 MILLIGRAM(S): 5 TABLET ORAL at 22:54

## 2018-03-18 NOTE — ED PROVIDER NOTE - MEDICAL DECISION MAKING DETAILS
41 y/o F  hx Bipolar, Substance Abuse, Anxiety, Seizure, CVA, BPD, Anorexia, Depression  Labs, Urine Tox/UA, EKG, HCG. No evidence of physical injures, broken skin or deformities.   Medical evaluation performed. There is no clinical evidence of intoxication or any acute medical problem requiring immediate intervention. Patient is awaiting psychiatric consultation. Final disposition will be determined by psychiatrist.
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
## 2018-03-18 NOTE — ED BEHAVIORAL HEALTH ASSESSMENT NOTE - DIFFERENTIAL
substance induced mood disorder vs. Adjustment Disorder with anxious mood   polysubstance dependence disorder  borderline personality disorder
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
## 2018-03-18 NOTE — ED PROVIDER NOTE - CARE PLAN
Principal Discharge DX:	Abrasions of multiple sites Principal Discharge DX:	Abrasions of multiple sites  Assessment and plan of treatment:	Please return to the ED at any time if you feel unsafe.

## 2018-03-18 NOTE — ED ADULT NURSE NOTE - ISOLATION TYPE:
Epidural Block    Start time: 9/6/2017 10:54 AM  End time: 9/6/2017 11:06 AM  Performed by: Yumiko Hampton by: Jamison Moss     Pre-Procedure  Indication: labor epidural    Preanesthetic Checklist: patient identified, risks and benefits discussed, anesthesia consent, patient being monitored, timeout performed and anesthesia consent    Timeout Time: 10:54        Epidural:   Patient position:  Left lateral decubitus  Prep region:  Lumbar  Prep: Chlorhexidine    Location:  L2-3    Needle and Epidural Catheter:   Needle Type:  Tuohy  Needle Gauge:  17 G  Injection Technique:  Loss of resistance using air  Attempts:  1  Catheter Size:  19 G  Events: no blood with aspiration, no cerebrospinal fluid with aspiration, no paresthesia and negative aspiration test    Test Dose:  Bupivacaine 0.25% and negative    Assessment:   Catheter Secured:  Tegaderm and tape  Insertion:  Uncomplicated  Patient tolerance:  Patient tolerated the procedure well with no immediate complications None
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
## 2018-03-18 NOTE — ED BEHAVIORAL HEALTH NOTE - BEHAVIORAL HEALTH NOTE
Writer provided [facility] card # [phone], as requested by the evaluating clinician. Writer provided [facility] card # [phone], as requested by the evaluating clinician. (patient later returned the card)
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
## 2018-03-18 NOTE — ED BEHAVIORAL HEALTH ASSESSMENT NOTE - RISK ASSESSMENT
Risk factors include hx of SA, family hx of mental illness and suicide, active substance use, housing instability, difficult relationship with mother and financial instability. Protective factors include good therapeutic relationship with PMD, future oriented, no SI/I/P, motivated for psychiatric tx and motivated to find a fulltime job. At this time pt is not an imminent risk to self or others and does not require psychiatric hospitalization.
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
## 2018-03-18 NOTE — ED PROVIDER NOTE - CARE PLAN
Principal Discharge DX:	Substance abuse Principal Discharge DX:	Adjustment disorder with anxious mood  Secondary Diagnosis:	Borderline personality disorder  Secondary Diagnosis:	Substance abuse

## 2018-03-18 NOTE — ED BEHAVIORAL HEALTH ASSESSMENT NOTE - DESCRIPTION (FIRST USE, LAST USE, QUANTITY, FREQUENCY, DURATION)
1ppd occasional use +utox used to take a lot of opiates but now on prescribed Oxycodone 30mg po bid Xanax
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
## 2018-03-18 NOTE — ED BEHAVIORAL HEALTH ASSESSMENT NOTE - OTHER PAST PSYCHIATRIC HISTORY (INCLUDE DETAILS REGARDING ONSET, COURSE OF ILLNESS, INPATIENT/OUTPATIENT TREATMENT)
Has 9 past psychiatric admissions at [facility] - last one in 2011. [facility].   Pt reported history of depression and has been under the care of her primary care physician, Dr. Luna since Nov. 2016 - But has an appointment at [facility].   Pt reported suicide attempt by OD of medications in 2011, no recent suicidal attempts since.
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
## 2018-03-18 NOTE — ED ADULT NURSE NOTE - OBJECTIVE STATEMENT
Pt rec'd to ED  area. Pt is Aox4, in NAD, arrives after dispute with her mom. Mom states pt was verbalizing suicidal thoughts. Pt denies SI/ HI/ auditory hallucinations/ visual disturbances. Pt states she is compliant with her medications but that her mom sometimes takes her pills for herself. Pt is calm and cooperative at this time. ADmits to having 2 shots vodka last night for [employer] Patricks day, denies regular ETOH or other drug use.
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
## 2018-03-18 NOTE — ED BEHAVIORAL HEALTH ASSESSMENT NOTE - DESCRIPTION
Calm and cooperative.   Vital Signs Last 24 Hrs  T(C): --  T(F): --  HR: 85 (18 Mar 2018 14:23) (85 - 85)  BP: 136/75 (18 Mar 2018 14:23) (136/75 - 136/75)  BP(mean): --  RR: 16 (18 Mar 2018 14:23) (16 - 16)  SpO2: 99% (18 Mar 2018 14:23) (99% - 99%) none on Disability for last 7 years. has a cash job as an aide now, single

## 2018-03-22 ENCOUNTER — EMERGENCY (EMERGENCY)
Facility: HOSPITAL | Age: 43
LOS: 1 days | Discharge: HOME | End: 2018-03-22
Attending: EMERGENCY MEDICINE
Payer: MEDICARE

## 2018-03-22 LAB
ACETONE SERPL-MCNC: NEGATIVE — SIGNIFICANT CHANGE UP
ALBUMIN SERPL ELPH-MCNC: 3.9 G/DL — SIGNIFICANT CHANGE UP (ref 3.5–5)
ALP SERPL-CCNC: 35 U/L — LOW (ref 40–120)
ALT FLD-CCNC: 22 U/L DA — SIGNIFICANT CHANGE UP (ref 10–60)
ANION GAP SERPL CALC-SCNC: 10 MMOL/L — SIGNIFICANT CHANGE UP (ref 5–17)
APTT BLD: 31.9 SEC — SIGNIFICANT CHANGE UP (ref 27.5–37.4)
AST SERPL-CCNC: 18 U/L — SIGNIFICANT CHANGE UP (ref 10–40)
BASOPHILS # BLD AUTO: 0.1 K/UL — SIGNIFICANT CHANGE UP (ref 0–0.2)
BASOPHILS NFR BLD AUTO: 1.9 % — SIGNIFICANT CHANGE UP (ref 0–2)
BILIRUB SERPL-MCNC: 0.3 MG/DL — SIGNIFICANT CHANGE UP (ref 0.2–1.2)
BUN SERPL-MCNC: 14 MG/DL — SIGNIFICANT CHANGE UP (ref 7–18)
CALCIUM SERPL-MCNC: 9 MG/DL — SIGNIFICANT CHANGE UP (ref 8.4–10.5)
CHLORIDE SERPL-SCNC: 110 MMOL/L — HIGH (ref 96–108)
CO2 SERPL-SCNC: 22 MMOL/L — SIGNIFICANT CHANGE UP (ref 22–31)
CREAT SERPL-MCNC: 0.73 MG/DL — SIGNIFICANT CHANGE UP (ref 0.5–1.3)
EOSINOPHIL # BLD AUTO: 0.1 K/UL — SIGNIFICANT CHANGE UP (ref 0–0.5)
EOSINOPHIL NFR BLD AUTO: 0.9 % — SIGNIFICANT CHANGE UP (ref 0–6)
GLUCOSE SERPL-MCNC: 105 MG/DL — HIGH (ref 70–99)
HCT VFR BLD CALC: 39 % — SIGNIFICANT CHANGE UP (ref 34.5–45)
HGB BLD-MCNC: 12.5 G/DL — SIGNIFICANT CHANGE UP (ref 11.5–15.5)
INR BLD: 1 RATIO — SIGNIFICANT CHANGE UP (ref 0.88–1.16)
LIDOCAIN IGE QN: 67 U/L — LOW (ref 73–393)
LYMPHOCYTES # BLD AUTO: 2.7 K/UL — SIGNIFICANT CHANGE UP (ref 1–3.3)
LYMPHOCYTES # BLD AUTO: 39 % — SIGNIFICANT CHANGE UP (ref 13–44)
MAGNESIUM SERPL-MCNC: 1.9 MG/DL — SIGNIFICANT CHANGE UP (ref 1.6–2.6)
MCHC RBC-ENTMCNC: 30.6 PG — SIGNIFICANT CHANGE UP (ref 27–34)
MCHC RBC-ENTMCNC: 32 GM/DL — SIGNIFICANT CHANGE UP (ref 32–36)
MCV RBC AUTO: 95.6 FL — SIGNIFICANT CHANGE UP (ref 80–100)
MONOCYTES # BLD AUTO: 0.6 K/UL — SIGNIFICANT CHANGE UP (ref 0–0.9)
MONOCYTES NFR BLD AUTO: 8.7 % — SIGNIFICANT CHANGE UP (ref 2–14)
NEUTROPHILS # BLD AUTO: 3.5 K/UL — SIGNIFICANT CHANGE UP (ref 1.8–7.4)
NEUTROPHILS NFR BLD AUTO: 49.4 % — SIGNIFICANT CHANGE UP (ref 43–77)
PLATELET # BLD AUTO: 297 K/UL — SIGNIFICANT CHANGE UP (ref 150–400)
POTASSIUM SERPL-MCNC: 3.6 MMOL/L — SIGNIFICANT CHANGE UP (ref 3.5–5.3)
POTASSIUM SERPL-SCNC: 3.6 MMOL/L — SIGNIFICANT CHANGE UP (ref 3.5–5.3)
PROT SERPL-MCNC: 7.7 G/DL — SIGNIFICANT CHANGE UP (ref 6–8.3)
PROTHROM AB SERPL-ACNC: 10.9 SEC — SIGNIFICANT CHANGE UP (ref 9.8–12.7)
RBC # BLD: 4.08 M/UL — SIGNIFICANT CHANGE UP (ref 3.8–5.2)
RBC # FLD: 12.1 % — SIGNIFICANT CHANGE UP (ref 10.3–14.5)
SODIUM SERPL-SCNC: 142 MMOL/L — SIGNIFICANT CHANGE UP (ref 135–145)
WBC # BLD: 7 K/UL — SIGNIFICANT CHANGE UP (ref 3.8–10.5)
WBC # FLD AUTO: 7 K/UL — SIGNIFICANT CHANGE UP (ref 3.8–10.5)

## 2018-03-22 PROCEDURE — 85610 PROTHROMBIN TIME: CPT

## 2018-03-22 PROCEDURE — 83690 ASSAY OF LIPASE: CPT

## 2018-03-22 PROCEDURE — 99285 EMERGENCY DEPT VISIT HI MDM: CPT

## 2018-03-22 PROCEDURE — 80053 COMPREHEN METABOLIC PANEL: CPT

## 2018-03-22 PROCEDURE — 99284 EMERGENCY DEPT VISIT MOD MDM: CPT | Mod: 25

## 2018-03-22 PROCEDURE — 83735 ASSAY OF MAGNESIUM: CPT

## 2018-03-22 PROCEDURE — 76856 US EXAM PELVIC COMPLETE: CPT

## 2018-03-22 PROCEDURE — 85027 COMPLETE CBC AUTOMATED: CPT

## 2018-03-22 PROCEDURE — 76856 US EXAM PELVIC COMPLETE: CPT | Mod: 26

## 2018-03-22 PROCEDURE — 85730 THROMBOPLASTIN TIME PARTIAL: CPT

## 2018-03-22 PROCEDURE — 96374 THER/PROPH/DIAG INJ IV PUSH: CPT

## 2018-03-22 PROCEDURE — 82009 KETONE BODYS QUAL: CPT

## 2018-03-22 RX ORDER — ALPRAZOLAM 0.25 MG
1 TABLET ORAL
Qty: 0 | Refills: 0 | COMMUNITY

## 2018-03-22 RX ORDER — AMPHETAMINE SULFATE 5 MG/1
20 TABLET ORAL
Qty: 0 | Refills: 0 | COMMUNITY

## 2018-03-22 RX ORDER — SUMATRIPTAN SUCCINATE 4 MG/.5ML
1 INJECTION, SOLUTION SUBCUTANEOUS
Qty: 0 | Refills: 0 | COMMUNITY

## 2018-03-22 RX ORDER — OXYCODONE HYDROCHLORIDE 5 MG/1
1 TABLET ORAL
Qty: 0 | Refills: 0 | COMMUNITY

## 2018-03-22 RX ORDER — SODIUM CHLORIDE 9 MG/ML
1000 INJECTION INTRAMUSCULAR; INTRAVENOUS; SUBCUTANEOUS
Qty: 0 | Refills: 0 | Status: DISCONTINUED | OUTPATIENT
Start: 2018-03-22 | End: 2018-03-26

## 2018-03-22 RX ORDER — SODIUM CHLORIDE 9 MG/ML
3 INJECTION INTRAMUSCULAR; INTRAVENOUS; SUBCUTANEOUS ONCE
Qty: 0 | Refills: 0 | Status: COMPLETED | OUTPATIENT
Start: 2018-03-22 | End: 2018-03-22

## 2018-03-22 RX ORDER — ALPRAZOLAM 0.25 MG
0 TABLET ORAL
Qty: 0 | Refills: 0 | COMMUNITY

## 2018-03-22 RX ORDER — SODIUM CHLORIDE 9 MG/ML
1000 INJECTION INTRAMUSCULAR; INTRAVENOUS; SUBCUTANEOUS ONCE
Qty: 0 | Refills: 0 | Status: COMPLETED | OUTPATIENT
Start: 2018-03-22 | End: 2018-03-22

## 2018-03-22 RX ORDER — MORPHINE SULFATE 50 MG/1
4 CAPSULE, EXTENDED RELEASE ORAL ONCE
Qty: 0 | Refills: 0 | Status: DISCONTINUED | OUTPATIENT
Start: 2018-03-22 | End: 2018-03-22

## 2018-03-22 RX ORDER — FLUOXETINE HCL 10 MG
1 CAPSULE ORAL
Qty: 0 | Refills: 0 | COMMUNITY

## 2018-03-22 RX ORDER — DEXTROAMPHETAMINE SACCHARATE, AMPHETAMINE ASPARTATE, DEXTROAMPHETAMINE SULFATE AND AMPHETAMINE SULFATE 1.875; 1.875; 1.875; 1.875 MG/1; MG/1; MG/1; MG/1
1 TABLET ORAL
Qty: 0 | Refills: 0 | COMMUNITY

## 2018-03-22 RX ORDER — ONDANSETRON 8 MG/1
4 TABLET, FILM COATED ORAL ONCE
Qty: 0 | Refills: 0 | Status: COMPLETED | OUTPATIENT
Start: 2018-03-22 | End: 2018-03-22

## 2018-03-22 RX ORDER — VENLAFAXINE HCL 75 MG
1 CAPSULE, EXT RELEASE 24 HR ORAL
Qty: 0 | Refills: 0 | COMMUNITY

## 2018-03-22 RX ADMIN — SODIUM CHLORIDE 3 MILLILITER(S): 9 INJECTION INTRAMUSCULAR; INTRAVENOUS; SUBCUTANEOUS at 15:31

## 2018-03-22 RX ADMIN — SODIUM CHLORIDE 1000 MILLILITER(S): 9 INJECTION INTRAMUSCULAR; INTRAVENOUS; SUBCUTANEOUS at 15:31

## 2018-03-22 RX ADMIN — MORPHINE SULFATE 4 MILLIGRAM(S): 50 CAPSULE, EXTENDED RELEASE ORAL at 14:34

## 2018-03-22 RX ADMIN — MORPHINE SULFATE 4 MILLIGRAM(S): 50 CAPSULE, EXTENDED RELEASE ORAL at 15:30

## 2018-03-22 NOTE — ED PROVIDER NOTE - OBJECTIVE STATEMENT
41 y/o F pt with a significant PMHx of bipolar disorder, borderline mood disorder, brain tumor, CVA, depression (Hx of SI), lumbar herniated disc, PTSD (s/p witnessing father's suicide), stroke and a significant PSHx of pregnancy termination presents to the ED c/o constant LLQ pain and L pelvic pain x this morning with associated nausea. Pt states her pain radiates to her back and is stabbing in nature. Pt reports she took Oxycodone 30mg (prescribed by pmd), Motrin and Tylenol for her pain to no relief. Pt denies fever, chills, vomiting, dysuria, vaginal discharge or any other complaints. Allergies: Sulfa drugs. (PMD: Dr. White). LMP: 3 days ago. LBM: yesterday.
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
## 2018-03-22 NOTE — ED ADULT NURSE REASSESSMENT NOTE - NS ED NURSE REASSESS COMMENT FT1
Pt remains alert uncooperative and verbally abuse demanding to remove IV heplock. IV removed dressing applied, Pt refuses to stay states  " I'M leaving not signing AMA" nurse manager Renuka Skinner and MD berman made aware, at 16;40 Pt walked out.

## 2018-03-22 NOTE — ED PROVIDER NOTE - PROGRESS NOTE DETAILS
Spoke to Dr. Marcin Luna, pt's pmd, [phone], who advised not to give pt any narcotics and to evaluate pt and d/c home. Pt stops crying right after Morphine was given.  sent pt for sono., upon arrival, pt removed her IV lock & walked out.  Sono- ovarian cyst, limited study, pt refuses transvaginal
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
## 2018-04-12 ENCOUNTER — EMERGENCY (EMERGENCY)
Facility: HOSPITAL | Age: 43
LOS: 1 days | Discharge: HOME | End: 2018-04-12
Attending: EMERGENCY MEDICINE
Payer: MEDICARE

## 2018-04-12 VITALS
DIASTOLIC BLOOD PRESSURE: 97 MMHG | OXYGEN SATURATION: 98 % | WEIGHT: 123.9 LBS | RESPIRATION RATE: 18 BRPM | HEART RATE: 100 BPM | SYSTOLIC BLOOD PRESSURE: 151 MMHG | HEIGHT: 66 IN | TEMPERATURE: 98 F

## 2018-04-12 DIAGNOSIS — S09.90XA UNSPECIFIED INJURY OF HEAD, INITIAL ENCOUNTER: ICD-10-CM

## 2018-04-12 PROCEDURE — 99282 EMERGENCY DEPT VISIT SF MDM: CPT

## 2018-04-12 PROCEDURE — 80307 DRUG TEST PRSMV CHEM ANLYZR: CPT

## 2018-04-12 PROCEDURE — 99282 EMERGENCY DEPT VISIT SF MDM: CPT | Mod: GC
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
## 2018-04-12 NOTE — ED PROVIDER NOTE - PLAN OF CARE
Follow up with your primary doctor as discussed. Return here to the emergency department for any new symptoms of concern such as homicidal / suicidal thoughts, or other new issues of acute worry.

## 2018-04-12 NOTE — ED PROVIDER NOTE - ATTENDING CONTRIBUTION TO CARE
Attending MD Garcia:  I personally have seen and examined this patient.  Resident note reviewed and agree on plan of care and except where noted.  See MDM for details.
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
## 2018-04-12 NOTE — ED ADULT NURSE NOTE - NS ED NURSE DC INFO COMPLEXITY
Verbalized Understanding/Simple: Patient demonstrates quick and easy understanding/Straightforward: Basic instructions, no meds, no home treatment

## 2018-04-12 NOTE — ED PROVIDER NOTE - PHYSICAL EXAMINATION
Gen: NAD, non-toxic, conversational  Eyes: PERRL, EOMI   HENT: Normocephalic, mild amount of infraorbital swelling, no ecchymosis, external ears normal, no rhinorrhea, moist mucous membranes.   CV: RRR, no M/R/G  Resp: CTAB, non-labored, speaking without difficulty on room air  Abd: soft, non tender, non rigid, no guarding or rebound tenderness  Back: No CVAT bilaterally, no midline ttp  Skin: dry, wwp   Neuro: AOx3, speech is fluent and appropriate  Psych: Mood upset, affect euthymic

## 2018-04-15 ENCOUNTER — EMERGENCY (EMERGENCY)
Facility: HOSPITAL | Age: 43
LOS: 1 days | Discharge: HOME | End: 2018-04-15
Attending: EMERGENCY MEDICINE
Payer: MEDICARE

## 2018-04-15 VITALS
TEMPERATURE: 100 F | SYSTOLIC BLOOD PRESSURE: 121 MMHG | RESPIRATION RATE: 13 BRPM | DIASTOLIC BLOOD PRESSURE: 88 MMHG | OXYGEN SATURATION: 100 % | HEART RATE: 86 BPM

## 2018-04-15 VITALS
RESPIRATION RATE: 17 BRPM | HEART RATE: 80 BPM | TEMPERATURE: 99 F | OXYGEN SATURATION: 100 % | SYSTOLIC BLOOD PRESSURE: 155 MMHG | DIASTOLIC BLOOD PRESSURE: 97 MMHG | WEIGHT: 119.93 LBS

## 2018-04-15 PROCEDURE — 99282 EMERGENCY DEPT VISIT SF MDM: CPT

## 2018-04-15 PROCEDURE — 99283 EMERGENCY DEPT VISIT LOW MDM: CPT

## 2018-04-15 NOTE — ED PROVIDER NOTE - PMH
3/09 Seizure--etiology, abrupt withdrawal from benzodiazepines    Anorexia/bolemia from age [age]    Anxiety    Back pain, unspecified back location, unspecified back pain laterality, unspecified chronicity    Bipolar disorder NOS    borderline mood disorder    Brain tumor    Cerebrovascular accident (CVA) due to other mechanism    Depression  multiple hospitalizations for attempted suicide---self committed herself to [facility] this past month.  Discharged 10/1/09  Lumbar Herniated Disc  intermittent b/l sciatica with R>L  Posttraumatic Stress Disorder  pt witnessed her father's suicide at the age of 39 with a gun at the age of 5  Brother, age 38, committed suicide on the father's anniversary of the father's suicide on 9/10/09  Smoker    Stroke  1 month ago  Syncope and collapse
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
## 2018-04-15 NOTE — ED ADULT NURSE NOTE - CHPI ED SYMPTOMS NEG
no confusion/no disorientation/no change in level of consciousness/no homicidal/no suicidal/no weakness
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
## 2018-04-15 NOTE — ED ADULT NURSE NOTE - PMH
3/09 Seizure--etiology, abrupt withdrawal from benzodiazepines    Anorexia/bolemia from age [age]    Anxiety    Back pain, unspecified back location, unspecified back pain laterality, unspecified chronicity    Bipolar disorder NOS    borderline mood disorder    Brain tumor    Cerebrovascular accident (CVA) due to other mechanism    Depression  multiple hospitalizations for attempted suicide---self committed herself to [facility] this past month.  Discharged 10/1/09  Lumbar Herniated Disc  intermittent b/l sciatica with R>L  Posttraumatic Stress Disorder  pt witnessed her father's suicide at the age of 38 with a gun at the age of 5  Brother, age 38, committed suicide on the father's anniversary of the father's suicide on 9/10/09  Smoker    Stroke  1 month ago  Syncope and collapse

## 2018-04-30 RX ORDER — DEXTROAMPHETAMINE SACCHARATE, AMPHETAMINE ASPARTATE, DEXTROAMPHETAMINE SULFATE AND AMPHETAMINE SULFATE 1.875; 1.875; 1.875; 1.875 MG/1; MG/1; MG/1; MG/1
0 TABLET ORAL
Qty: 0 | Refills: 0 | COMMUNITY

## 2018-04-30 RX ORDER — VENLAFAXINE HCL 75 MG
0 CAPSULE, EXT RELEASE 24 HR ORAL
Qty: 0 | Refills: 0 | COMMUNITY

## 2018-04-30 RX ORDER — OXYCODONE HYDROCHLORIDE 5 MG/1
0 TABLET ORAL
Qty: 0 | Refills: 0 | COMMUNITY

## 2018-04-30 RX ORDER — ALPRAZOLAM 0.25 MG
0 TABLET ORAL
Qty: 0 | Refills: 0 | COMMUNITY

## 2019-04-29 PROBLEM — F32.9 MAJOR DEPRESSIVE DISORDER, SINGLE EPISODE, UNSPECIFIED: Chronic | Status: ACTIVE | Noted: 2018-04-12

## 2019-04-29 PROBLEM — M54.9 DORSALGIA, UNSPECIFIED: Chronic | Status: ACTIVE | Noted: 2017-03-19

## 2019-04-29 PROBLEM — F41.9 ANXIETY DISORDER, UNSPECIFIED: Chronic | Status: ACTIVE | Noted: 2018-04-12

## 2019-04-29 PROBLEM — I63.8 OTHER CEREBRAL INFARCTION: Chronic | Status: ACTIVE | Noted: 2017-03-19

## 2019-04-29 PROBLEM — F43.10 POST-TRAUMATIC STRESS DISORDER, UNSPECIFIED: Chronic | Status: ACTIVE | Noted: 2018-04-12

## 2019-04-29 PROBLEM — I63.9 CEREBRAL INFARCTION, UNSPECIFIED: Chronic | Status: ACTIVE | Noted: 2017-02-18

## 2019-04-29 PROBLEM — D49.7 NEOPLASM OF UNSPECIFIED BEHAVIOR OF ENDOCRINE GLANDS AND OTHER PARTS OF NERVOUS SYSTEM: Chronic | Status: ACTIVE | Noted: 2018-04-12

## 2019-04-30 ENCOUNTER — APPOINTMENT (OUTPATIENT)
Dept: PULMONOLOGY | Facility: CLINIC | Age: 44
End: 2019-04-30

## 2019-09-04 ENCOUNTER — EMERGENCY (EMERGENCY)
Facility: HOSPITAL | Age: 44
LOS: 1 days | Discharge: HOME | End: 2019-09-04
Attending: EMERGENCY MEDICINE
Payer: MEDICARE

## 2019-09-04 VITALS
WEIGHT: 134.92 LBS | DIASTOLIC BLOOD PRESSURE: 70 MMHG | TEMPERATURE: 98 F | HEIGHT: 66 IN | OXYGEN SATURATION: 100 % | RESPIRATION RATE: 20 BRPM | HEART RATE: 76 BPM | SYSTOLIC BLOOD PRESSURE: 113 MMHG

## 2019-09-04 LAB
ALBUMIN SERPL ELPH-MCNC: 4.8 G/DL — SIGNIFICANT CHANGE UP (ref 3.3–5)
ALP SERPL-CCNC: 71 U/L — SIGNIFICANT CHANGE UP (ref 40–120)
ALT FLD-CCNC: 38 U/L — SIGNIFICANT CHANGE UP (ref 10–45)
ANION GAP SERPL CALC-SCNC: 12 MMOL/L — SIGNIFICANT CHANGE UP (ref 5–17)
APPEARANCE UR: CLEAR — SIGNIFICANT CHANGE UP
APTT BLD: 34.9 SEC — SIGNIFICANT CHANGE UP (ref 27.5–36.3)
AST SERPL-CCNC: 31 U/L — SIGNIFICANT CHANGE UP (ref 10–40)
BASE EXCESS BLDV CALC-SCNC: 4 MMOL/L — HIGH (ref -2–2)
BASOPHILS # BLD AUTO: 0.1 K/UL — SIGNIFICANT CHANGE UP (ref 0–0.2)
BASOPHILS NFR BLD AUTO: 0.5 % — SIGNIFICANT CHANGE UP (ref 0–2)
BILIRUB SERPL-MCNC: 0.2 MG/DL — SIGNIFICANT CHANGE UP (ref 0.2–1.2)
BILIRUB UR-MCNC: NEGATIVE — SIGNIFICANT CHANGE UP
BUN SERPL-MCNC: 12 MG/DL — SIGNIFICANT CHANGE UP (ref 7–23)
CA-I SERPL-SCNC: 1.26 MMOL/L — SIGNIFICANT CHANGE UP (ref 1.12–1.3)
CALCIUM SERPL-MCNC: 10.4 MG/DL — SIGNIFICANT CHANGE UP (ref 8.4–10.5)
CHLORIDE BLDV-SCNC: 101 MMOL/L — SIGNIFICANT CHANGE UP (ref 96–108)
CHLORIDE SERPL-SCNC: 97 MMOL/L — SIGNIFICANT CHANGE UP (ref 96–108)
CO2 BLDV-SCNC: 34 MMOL/L — HIGH (ref 22–30)
CO2 SERPL-SCNC: 30 MMOL/L — SIGNIFICANT CHANGE UP (ref 22–31)
COLOR SPEC: SIGNIFICANT CHANGE UP
CREAT SERPL-MCNC: 0.71 MG/DL — SIGNIFICANT CHANGE UP (ref 0.5–1.3)
DIFF PNL FLD: NEGATIVE — SIGNIFICANT CHANGE UP
EOSINOPHIL # BLD AUTO: 0.1 K/UL — SIGNIFICANT CHANGE UP (ref 0–0.5)
EOSINOPHIL NFR BLD AUTO: 0.5 % — SIGNIFICANT CHANGE UP (ref 0–6)
GAS PNL BLDV: 141 MMOL/L — SIGNIFICANT CHANGE UP (ref 135–145)
GAS PNL BLDV: SIGNIFICANT CHANGE UP
GAS PNL BLDV: SIGNIFICANT CHANGE UP
GLUCOSE BLDV-MCNC: 98 MG/DL — SIGNIFICANT CHANGE UP (ref 70–99)
GLUCOSE SERPL-MCNC: 108 MG/DL — HIGH (ref 70–99)
GLUCOSE UR QL: NEGATIVE — SIGNIFICANT CHANGE UP
HCG SERPL-ACNC: <2 MIU/ML — SIGNIFICANT CHANGE UP
HCO3 BLDV-SCNC: 32 MMOL/L — HIGH (ref 21–29)
HCT VFR BLD CALC: 46.7 % — HIGH (ref 34.5–45)
HCT VFR BLDA CALC: 47 % — SIGNIFICANT CHANGE UP (ref 39–50)
HGB BLD CALC-MCNC: 15.4 G/DL — SIGNIFICANT CHANGE UP (ref 11.5–15.5)
HGB BLD-MCNC: 15.3 G/DL — SIGNIFICANT CHANGE UP (ref 11.5–15.5)
INR BLD: 0.96 RATIO — SIGNIFICANT CHANGE UP (ref 0.88–1.16)
KETONES UR-MCNC: NEGATIVE — SIGNIFICANT CHANGE UP
LACTATE BLDV-MCNC: 1.7 MMOL/L — SIGNIFICANT CHANGE UP (ref 0.7–2)
LEUKOCYTE ESTERASE UR-ACNC: NEGATIVE — SIGNIFICANT CHANGE UP
LYMPHOCYTES # BLD AUTO: 15.5 % — SIGNIFICANT CHANGE UP (ref 13–44)
LYMPHOCYTES # BLD AUTO: 2.1 K/UL — SIGNIFICANT CHANGE UP (ref 1–3.3)
MCHC RBC-ENTMCNC: 31.2 PG — SIGNIFICANT CHANGE UP (ref 27–34)
MCHC RBC-ENTMCNC: 32.8 GM/DL — SIGNIFICANT CHANGE UP (ref 32–36)
MCV RBC AUTO: 94.8 FL — SIGNIFICANT CHANGE UP (ref 80–100)
MONOCYTES # BLD AUTO: 0.9 K/UL — SIGNIFICANT CHANGE UP (ref 0–0.9)
MONOCYTES NFR BLD AUTO: 6.7 % — SIGNIFICANT CHANGE UP (ref 2–14)
NEUTROPHILS # BLD AUTO: 10.4 K/UL — HIGH (ref 1.8–7.4)
NEUTROPHILS NFR BLD AUTO: 76.8 % — SIGNIFICANT CHANGE UP (ref 43–77)
NITRITE UR-MCNC: NEGATIVE — SIGNIFICANT CHANGE UP
PCO2 BLDV: 66 MMHG — HIGH (ref 35–50)
PH BLDV: 7.31 — LOW (ref 7.35–7.45)
PH UR: 7.5 — SIGNIFICANT CHANGE UP (ref 5–8)
PLATELET # BLD AUTO: 316 K/UL — SIGNIFICANT CHANGE UP (ref 150–400)
PO2 BLDV: 22 MMHG — LOW (ref 25–45)
POTASSIUM BLDV-SCNC: 3.9 MMOL/L — SIGNIFICANT CHANGE UP (ref 3.5–5.3)
POTASSIUM SERPL-MCNC: 3.9 MMOL/L — SIGNIFICANT CHANGE UP (ref 3.5–5.3)
POTASSIUM SERPL-SCNC: 3.9 MMOL/L — SIGNIFICANT CHANGE UP (ref 3.5–5.3)
PROT SERPL-MCNC: 8.1 G/DL — SIGNIFICANT CHANGE UP (ref 6–8.3)
PROT UR-MCNC: NEGATIVE — SIGNIFICANT CHANGE UP
PROTHROM AB SERPL-ACNC: 11 SEC — SIGNIFICANT CHANGE UP (ref 10–12.9)
RBC # BLD: 4.92 M/UL — SIGNIFICANT CHANGE UP (ref 3.8–5.2)
RBC # FLD: 11.7 % — SIGNIFICANT CHANGE UP (ref 10.3–14.5)
SAO2 % BLDV: 32 % — LOW (ref 67–88)
SODIUM SERPL-SCNC: 139 MMOL/L — SIGNIFICANT CHANGE UP (ref 135–145)
SP GR SPEC: 1.01 — LOW (ref 1.01–1.02)
TROPONIN T, HIGH SENSITIVITY RESULT: <6 NG/L — SIGNIFICANT CHANGE UP (ref 0–51)
UROBILINOGEN FLD QL: NEGATIVE — SIGNIFICANT CHANGE UP
WBC # BLD: 13.6 K/UL — HIGH (ref 3.8–10.5)
WBC # FLD AUTO: 13.6 K/UL — HIGH (ref 3.8–10.5)

## 2019-09-04 PROCEDURE — 99285 EMERGENCY DEPT VISIT HI MDM: CPT

## 2019-09-04 PROCEDURE — 83605 ASSAY OF LACTIC ACID: CPT

## 2019-09-04 PROCEDURE — 85014 HEMATOCRIT: CPT

## 2019-09-04 PROCEDURE — 93010 ELECTROCARDIOGRAM REPORT: CPT

## 2019-09-04 PROCEDURE — 81003 URINALYSIS AUTO W/O SCOPE: CPT

## 2019-09-04 PROCEDURE — 82330 ASSAY OF CALCIUM: CPT

## 2019-09-04 PROCEDURE — 99284 EMERGENCY DEPT VISIT MOD MDM: CPT | Mod: 25

## 2019-09-04 PROCEDURE — 76705 ECHO EXAM OF ABDOMEN: CPT | Mod: 26,RT

## 2019-09-04 PROCEDURE — 82435 ASSAY OF BLOOD CHLORIDE: CPT

## 2019-09-04 PROCEDURE — 71045 X-RAY EXAM CHEST 1 VIEW: CPT | Mod: 26

## 2019-09-04 PROCEDURE — 80053 COMPREHEN METABOLIC PANEL: CPT

## 2019-09-04 PROCEDURE — 82947 ASSAY GLUCOSE BLOOD QUANT: CPT

## 2019-09-04 PROCEDURE — 70450 CT HEAD/BRAIN W/O DYE: CPT | Mod: 26

## 2019-09-04 PROCEDURE — 85610 PROTHROMBIN TIME: CPT

## 2019-09-04 PROCEDURE — 71275 CT ANGIOGRAPHY CHEST: CPT

## 2019-09-04 PROCEDURE — 84132 ASSAY OF SERUM POTASSIUM: CPT

## 2019-09-04 PROCEDURE — 71275 CT ANGIOGRAPHY CHEST: CPT | Mod: 26

## 2019-09-04 PROCEDURE — 84295 ASSAY OF SERUM SODIUM: CPT

## 2019-09-04 PROCEDURE — 84484 ASSAY OF TROPONIN QUANT: CPT

## 2019-09-04 PROCEDURE — 93005 ELECTROCARDIOGRAM TRACING: CPT

## 2019-09-04 PROCEDURE — 85730 THROMBOPLASTIN TIME PARTIAL: CPT

## 2019-09-04 PROCEDURE — 84702 CHORIONIC GONADOTROPIN TEST: CPT

## 2019-09-04 PROCEDURE — 70450 CT HEAD/BRAIN W/O DYE: CPT

## 2019-09-04 PROCEDURE — 82803 BLOOD GASES ANY COMBINATION: CPT

## 2019-09-04 PROCEDURE — 85027 COMPLETE CBC AUTOMATED: CPT

## 2019-09-04 PROCEDURE — 76705 ECHO EXAM OF ABDOMEN: CPT

## 2019-09-04 PROCEDURE — 71045 X-RAY EXAM CHEST 1 VIEW: CPT

## 2019-09-04 RX ORDER — ALPRAZOLAM 0.25 MG
2 TABLET ORAL ONCE
Refills: 0 | Status: DISCONTINUED | OUTPATIENT
Start: 2019-09-04 | End: 2019-09-04

## 2019-09-04 RX ORDER — OXYCODONE HYDROCHLORIDE 5 MG/1
30 TABLET ORAL ONCE
Refills: 0 | Status: DISCONTINUED | OUTPATIENT
Start: 2019-09-04 | End: 2019-09-04

## 2019-09-04 RX ADMIN — OXYCODONE HYDROCHLORIDE 30 MILLIGRAM(S): 5 TABLET ORAL at 23:46

## 2019-09-04 RX ADMIN — Medication 2 MILLIGRAM(S): at 20:52

## 2019-09-04 NOTE — ED PROVIDER NOTE - CARE PROVIDER_API CALL
Miguel Angel Torrez)  Surgery  [address], [address]  [place] [address]  Phone: [phone]  Fax: [phone]  Follow Up Time:

## 2019-09-04 NOTE — ED ADULT NURSE NOTE - ED STAT RN HANDOFF DETAILS
Bedside report given to on coming nurse Otilia. Understands pmh, medications given and plan of care for patient. Patient in stable condition, vital signs updated, has no complaints at this time and has been updated on care plan. Explained to patient that it is change of shift and new nurse is taking over, pt verbalized understanding.

## 2019-09-04 NOTE — ED ADULT NURSE REASSESSMENT NOTE - NS ED NURSE REASSESS COMMENT FT1
1900: Report taken from JOSE LUIS Bai.     2000: Pt reports "feeling better now. I just feel a little anxious from being here." Pt denying dizziness, chest pain, SOB. Safety and comfort provided. Waiting for CT results and dispo.

## 2019-09-04 NOTE — ED PROVIDER NOTE - MUSCULOSKELETAL, MLM
Spine appears normal no midline tenderness, range of motion is not limited, moving all 4 extremities freely without difficulty
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
## 2019-09-04 NOTE — ED PROVIDER NOTE - PATIENT PORTAL LINK FT
You can access the FollowMyHealth Patient Portal offered by [facility] by registering at the following website: [email or web address]. By joining [facility]’s FollowMyHealth portal, you will also be able to view your health information using other applications (apps) compatible with our system.
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
## 2019-09-04 NOTE — ED PROVIDER NOTE - PMH
3/09 Seizure--etiology, abrupt withdrawal from benzodiazepines    Anorexia/bolemia from age [age]    Anxiety    Anxiety    Back pain, unspecified back location, unspecified back pain laterality, unspecified chronicity    Bipolar disorder NOS    borderline mood disorder    Brain tumor    Cerebrovascular accident (CVA) due to other mechanism    Depression    Depression  multiple hospitalizations for attempted suicide---self committed herself to [facility] this past month.  Discharged 10/1/09  Lumbar Herniated Disc  intermittent b/l sciatica with R>L  Pineal tumor    Posttraumatic Stress Disorder  pt witnessed her father's suicide at the age of 39 with a gun at the age of 5  Brother, age 38, committed suicide on the father's anniversary of the father's suicide on 9/10/09  PTSD (post-traumatic stress disorder)    Smoker    Stroke  1 month ago  Syncope and collapse

## 2019-09-04 NOTE — ED PROVIDER NOTE - PROGRESS NOTE DETAILS
discussed results with patient, states symptoms have resolved. feeling well. patient with appointment to see PMD tomorrow at 10:45 am, comfortable with plan for DC if testing negative and can follow up outpatient with her physician. - Sonia Gates PA-C discussed CBD findings with patient and need for GI follow up, states she has a GI physician, denies RUQ pain but then on exam reports it is uncomfortable when palpating. will obtain US, low suspicion for acute boris, and likely outpatient follow up with GI - Sonia Gates PA-C patient found to have positive sonographic mcdaniel's and sludge. surgery saw patient offering additional imaging and admission. patient wants to leave AMA. Patient understands risks of leaving such as worsening condition, permanent disability and death. patient is cooperative and aox3 at time of conversation. will give surgery f/u and can return for any symptoms
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
## 2019-09-04 NOTE — ED PROVIDER NOTE - NSFOLLOWUPINSTRUCTIONS_ED_ALL_ED_FT
Follow up with your Primary Care Physician within the next 2-3 days  Bring a copy of your test results with you to your appointment  Continue your current medication regimen  Return to the Emergency Room if you experience new or worsening symptoms  stay hydrated  Follow up with your GI doctor regarding the finding of an enlarged CBD on CT scan, you may require further testing.
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
## 2019-09-05 VITALS
OXYGEN SATURATION: 100 % | TEMPERATURE: 98 F | RESPIRATION RATE: 18 BRPM | DIASTOLIC BLOOD PRESSURE: 57 MMHG | SYSTOLIC BLOOD PRESSURE: 110 MMHG | HEART RATE: 95 BPM
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
## 2019-09-05 NOTE — ED ADULT NURSE REASSESSMENT NOTE - NS ED NURSE REASSESS COMMENT FT1
Waiting for surgery consult. Pt states pain by RUQ only occurs when pushing in. Denying pain currently. Resting comfortably with mom at bedside.

## 2019-09-05 NOTE — CONSULT NOTE ADULT - ASSESSMENT
43F w/ multiple PMH, surgery consulted to r/o acute cholecystitis in  setting of positive sonographic Diaz's sign in ED    - 43F w/ multiple PMH, surgery consulted to r/o acute cholecystitis in  setting of positive sonographic Diaz's sign in ED    -Discussed w/ the pt an patient's mother extensively regarding lab results, RUQ US study results, and physical exam findings. Recommended that patient be admitted for further studies and evaluation to r/o acute cholecystitis. However, pt states that she's had "bad experiences" with hospitals and she does not want to be admitted. Pt stated that she just wants to go home and would follow up with Dr. AISHA Torrez (on call General Surgery attending) as an outpatient. Appropriate information was given, discussed with ED attending Dr. Frederick with the patient and patient's mother. Patient still wishes to leave the hospital AMA.  - Please have pt follow up with Dr. AISHA Torrez as outpatient.  - Discussed w/ Dr. AISHA Banda PGY-2  [facility]

## 2019-09-05 NOTE — ED ADULT NURSE REASSESSMENT NOTE - NS ED NURSE REASSESS COMMENT FT1
Pt states desire to sign out AMA. MD Frederick made aware. She states she is waiting for surgery recommendations and that she will come to see pt shortly.
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
## 2020-03-27 NOTE — ED ADULT NURSE NOTE - CARDIO WDL
Diet: Regular  DVT Prophylaxis: Lovenox daily for DVT ppx as pt nonambulatory and elevated risk of VTE COVID pts  Dispo: Pending resolution of symptoms Normal rate, regular rhythm, normal S1, S2 heart sounds heard.

## 2021-07-28 NOTE — H&P ADULT - ASSESSMENT
Patient is a 40 y/o F from home lives with fiancé ? with PMHx of anorexia, anxiety, back pain, bipolar disorder, suicidal attempts came in to the ED after she passed out in the bathroom yesterday.As per outpatient psych dr shea patient is very unreliable historian and says that she was actively suicidal when he saw her 3 weeks ago. He recommends patient be transferred to an inpatient psychiatric facility. no need to rule out cardiac etiology or further tele monitoring including syncope work up per discussion with attending as ekg doesnt show any significant st or t wave changes and symptoms likely secondary to psychiatric condition . Admitted for psych evaluation and possible transfer to inpatient psych facility Body Location Override (Optional - Billing Will Still Be Based On Selected Body Map Location If Applicable): midline lower sternum Detail Level: Simple Depth Of Biopsy: dermis Was A Bandage Applied: Yes Size Of Lesion In Cm: 1 X Size Of Lesion In Cm: 0 Biopsy Type: H and E Biopsy Method: double edge Personna blade Anesthesia Type: 0.5% lidocaine with 1:200,000 epinephrine and a 1:10 solution of 8.4% sodium bicarbonate Anesthesia Volume In Cc: 0.5 Hemostasis: Aluminum Chloride Wound Care: Bacitracin Dressing: pressure dressing with telfa Destruction After The Procedure: No Type Of Destruction Used: Curettage Curettage Text: The wound bed was treated with curettage after the biopsy was performed. Cryotherapy Text: The wound bed was treated with cryotherapy after the biopsy was performed. Electrodesiccation Text: The wound bed was treated with electrodesiccation after the biopsy was performed. Electrodesiccation And Curettage Text: The wound bed was treated with electrodesiccation and curettage after the biopsy was performed. Silver Nitrate Text: The wound bed was treated with silver nitrate after the biopsy was performed. Lab: -[id number] Consent was obtained and risks were reviewed including but not limited to scarring, infection, bleeding, scabbing, incomplete removal, nerve damage and allergy to anesthesia. Post-Care Instructions: I reviewed with the patient in detail post-care instructions. Patient is to keep the biopsy site dry overnight, wash with soap and water and then apply ointment daily healed. Notification Instructions: Patient will be notified of biopsy results. However, patient instructed to call the office if not contacted within 2 weeks. Billing Type: Third-Party Bill Information: Selecting Yes will display possible errors in your note based on the variables you have selected. This validation is only offered as a suggestion for you. PLEASE NOTE THAT THE VALIDATION TEXT WILL BE REMOVED WHEN YOU FINALIZE YOUR NOTE. IF YOU WANT TO FAX A PRELIMINARY NOTE YOU WILL NEED TO TOGGLE THIS TO 'NO' IF YOU DO NOT WANT IT IN YOUR FAXED NOTE.

## 2021-09-08 NOTE — ED PROVIDER NOTE - NEUROLOGICAL, MLM
Mackenzie Mary, -[phone]    [facility] accepts patient.  He will need medication in hand, and scheduled follow up appointment with Primary MD.  Fax [facility] discharge summary and homeless letter.  Will need negative Rapid Covid Test.  Arrange discharge with Susie.   Alert and oriented, no focal deficits, no motor or sensory deficits.
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
## 2022-02-23 NOTE — ED PROVIDER NOTE - CROS ED HEME ALL NEG
Epidural Block    Date/Time: 2/23/2022 6:15 AM  Performed by: Paige Parks CRNA  Authorized by: Paige Parks CRNA     Patient Location:  L&D  Indication: Labor Pain    Pre anesthesia checklist Patient Identified (2 criteria), Consent Verified, Necessary Block Equipment Present, Monitors applied, IV Bolus, Allergies confirmed, Block Plan Confirmed, Drugs/Solutions Labeled, IV Access functioning, Timeout Performed, Coagulation Status, Block site marked (if applicable), Resuscitation equipment available, Sedation given (if needed) and Aseptic technique  Epidural:     Patient Position:  Sitting    Prep:  Chlorhexidine Gluconate    Max Sterile Barrier Technique:  Hand washing, cap/mask, sterile gloves, sterile drape and sterile dressing applied    Monitoring:  Heart rate, continuous pulse oximetry and non-invasive blood pressure    Approach:  Midline    Location:  L3-4  Injection Technique:  DEMARCUS saline  Ultrasound Used:  No  Needle and Epidural Catheter:     Needle Type:  Tuohy    Needle Gauge:  17 G    Needle Length:  3.5 in    DEMARCUS Depth:  8 cm  Catheter Type:  Side hole  Catheter Size:  19 G  Catheter at Skin Depth:  14 cm  Securement:  Stabilization device, Tape and Transparent dressing  Test Dose:  Lidocaine 1.5% with epinephrine 1-to-200,000  Test Dose Volume (mL):  3  Test Dose Result:  Negative  Initial Local Loading Concentration (%):  0.25  Initial Local Loading Volume (mL):  7  Initial Loading Narcotic Used:  Fentanyl   100   mcg  Assessment:    Block Outcome: pain improved  Number of Attempts: 1   Procedure Assessment: patient tolerated procedure well with no complications   Sensory Level:  T6  Staff:     Performed By:  CRNA    CRNA:  Paige Parks CRNA  Start Time:  2/23/2022 6:12 AM         negative...

## 2022-04-14 ENCOUNTER — APPOINTMENT (OUTPATIENT)
Dept: GASTROENTEROLOGY | Facility: CLINIC | Age: 47
End: 2022-04-14
Payer: MEDICARE

## 2022-04-14 VITALS — BODY MASS INDEX: 24.43 KG/M2 | HEIGHT: 66 IN | WEIGHT: 152 LBS

## 2022-04-14 VITALS — HEART RATE: 70 BPM | RESPIRATION RATE: 12 BRPM | DIASTOLIC BLOOD PRESSURE: 68 MMHG | SYSTOLIC BLOOD PRESSURE: 120 MMHG

## 2022-04-14 DIAGNOSIS — R19.7 DIARRHEA, UNSPECIFIED: ICD-10-CM

## 2022-04-14 PROCEDURE — 99214 OFFICE O/P EST MOD 30 MIN: CPT

## 2022-04-14 RX ORDER — ARIPIPRAZOLE 2 MG/1
TABLET ORAL
Refills: 0 | Status: ACTIVE | COMMUNITY

## 2022-04-14 NOTE — PHYSICAL EXAM
[General Appearance - Alert] : alert [General Appearance - In No Acute Distress] : in no acute distress [Sclera] : the sclera and conjunctiva were normal [PERRL With Normal Accommodation] : pupils were equal in size, round, and reactive to light [Extraocular Movements] : extraocular movements were intact [Outer Ear] : the ears and nose were normal in appearance [Oropharynx] : the oropharynx was normal [Neck Appearance] : the appearance of the neck was normal [Neck Cervical Mass (___cm)] : no neck mass was observed [Jugular Venous Distention Increased] : there was no jugular-venous distention [Thyroid Diffuse Enlargement] : the thyroid was not enlarged [Thyroid Nodule] : there were no palpable thyroid nodules [Auscultation Breath Sounds / Voice Sounds] : lungs were clear to auscultation bilaterally [Full Pulse] : the pedal pulses are present [Edema] : there was no peripheral edema [Bowel Sounds] : normal bowel sounds [Abdomen Soft] : soft [Abdomen Tenderness] : non-tender [Abdomen Mass (___ Cm)] : no abdominal mass palpated [No CVA Tenderness] : no ~M costovertebral angle tenderness [No Spinal Tenderness] : no spinal tenderness [Abnormal Walk] : normal gait [Nail Clubbing] : no clubbing  or cyanosis of the fingernails [Musculoskeletal - Swelling] : no joint swelling seen [Motor Tone] : muscle strength and tone were normal [Skin Color & Pigmentation] : normal skin color and pigmentation [Skin Turgor] : normal skin turgor [] : no rash [Deep Tendon Reflexes (DTR)] : deep tendon reflexes were 2+ and symmetric [Sensation] : the sensory exam was normal to light touch and pinprick [No Focal Deficits] : no focal deficits [Oriented To Time, Place, And Person] : oriented to person, place, and time [Impaired Insight] : insight and judgment were intact [Affect] : the affect was normal

## 2022-04-14 NOTE — HISTORY OF PRESENT ILLNESS
[Heartburn] : heartburn worsened [Nausea] : denies nausea [Vomiting] : denies vomiting [Diarrhea] : denies diarrhea [Constipation] : denies constipation [Yellow Skin Or Eyes (Jaundice)] : denies jaundice [Abdominal Pain] : denies abdominal pain [Abdominal Swelling] : denies abdominal swelling [Rectal Pain] : denies rectal pain [GERD] : gastroesophageal reflux disease [_________] : Performed [unfilled] [Fair Compliance] : fair compliance with treatment [Fair Tolerance] : fair tolerance of treatment [Fair Symptom Control] : fair symptom control [Hiatus Hernia] : no hiatus hernia [Peptic Ulcer Disease] : no peptic ulcer disease [Pancreatitis] : no pancreatitis [Cholelithiasis] : no cholelithiasis [Kidney Stone] : no kidney stone [Inflammatory Bowel Disease] : no inflammatory bowel disease [Irritable Bowel Syndrome] : no irritable bowel syndrome [Diverticulitis] : no diverticulitis [Alcohol Abuse] : no alcohol abuse [Malignancy] : no malignancy [Abdominal Surgery] : no abdominal surgery [Appendectomy] : no appendectomy [Cholecystectomy] : no cholecystectomy [de-identified] : 46-year-old female premenopausal history of bipolar disorder on Lamictal and Abilify with chronic GERD and previous Schatzki ring on endoscopy 2 years ago.  She recent developed some dysphagia and GERD symptoms.  She is been off the pantoprazole.  There is no nausea or vomiting but occasionally she needs to bring up her food.  There is no weight loss or anorexia.  She is having hot flashes.Some diarrhea has developed this week. [de-identified] : LA grade b esophagitis, wilianki ring

## 2022-04-14 NOTE — ASSESSMENT
[FreeTextEntry1] : 46-year-old female premenopausal history of bipolar disorder on Lamictal and Abilify with chronic GERD and previous Schatzki ring on endoscopy 2 years ago.  She recent developed some dysphagia and GERD symptoms.  She is been off the pantoprazole.  There is no nausea or vomiting but occasionally she needs to bring up her food.  There is no weight loss or anorexia.  She is having hot flashes.Some diarrhea has developed this week.

## 2022-05-20 ENCOUNTER — APPOINTMENT (OUTPATIENT)
Dept: GASTROENTEROLOGY | Facility: CLINIC | Age: 47
End: 2022-05-20

## 2022-06-13 ENCOUNTER — APPOINTMENT (OUTPATIENT)
Dept: GASTROENTEROLOGY | Facility: CLINIC | Age: 47
End: 2022-06-13
Payer: MEDICARE

## 2022-06-13 VITALS
SYSTOLIC BLOOD PRESSURE: 110 MMHG | TEMPERATURE: 98 F | HEIGHT: 66 IN | RESPIRATION RATE: 12 BRPM | BODY MASS INDEX: 24.43 KG/M2 | WEIGHT: 152 LBS | DIASTOLIC BLOOD PRESSURE: 68 MMHG | HEART RATE: 70 BPM

## 2022-06-13 DIAGNOSIS — G89.4 CHRONIC PAIN SYNDROME: ICD-10-CM

## 2022-06-13 DIAGNOSIS — G51.39 CLONIC HEMIFACIAL SPASM, UNSPECIFIED: ICD-10-CM

## 2022-06-13 PROCEDURE — 99214 OFFICE O/P EST MOD 30 MIN: CPT

## 2022-06-13 NOTE — ASSESSMENT
[FreeTextEntry1] : 46-year-old female premenopausal history of bipolar disorder on Lamictal and Abilify with chronic GERD and previous Schatzki ring on endoscopy 2 years ago.  She recent developed some dysphagia and GERD symptoms.  She is been off the pantoprazole.  There is no nausea or vomiting but occasionally she needs to bring up her food.  There is no weight loss or anorexia.  She is having hot flashes.\par She had an episode of acute dysphagia after eating a soft pretzel 3 days ago. Her boyfriend performed a heimlich maneuver.

## 2022-06-13 NOTE — HISTORY OF PRESENT ILLNESS
[Heartburn] : heartburn worsened [Vomiting] : denies vomiting [Nausea] : denies nausea [Diarrhea] : denies diarrhea [Constipation] : denies constipation [Yellow Skin Or Eyes (Jaundice)] : denies jaundice [Abdominal Pain] : denies abdominal pain [Rectal Pain] : denies rectal pain [Abdominal Swelling] : denies abdominal swelling [GERD] : gastroesophageal reflux disease [_________] : Performed [unfilled] [Fair Compliance] : fair compliance with treatment [Fair Tolerance] : fair tolerance of treatment [Fair Symptom Control] : fair symptom control [Hiatus Hernia] : no hiatus hernia [Peptic Ulcer Disease] : no peptic ulcer disease [Pancreatitis] : no pancreatitis [Cholelithiasis] : no cholelithiasis [Kidney Stone] : no kidney stone [Inflammatory Bowel Disease] : no inflammatory bowel disease [Irritable Bowel Syndrome] : no irritable bowel syndrome [Diverticulitis] : no diverticulitis [Alcohol Abuse] : no alcohol abuse [Malignancy] : no malignancy [Abdominal Surgery] : no abdominal surgery [Appendectomy] : no appendectomy [Cholecystectomy] : no cholecystectomy [de-identified] : 46-year-old female premenopausal history of bipolar disorder on Lamictal and Abilify with chronic GERD and previous Schatzki ring on endoscopy 2 years ago.  She recent developed some dysphagia and GERD symptoms.  She is been off the pantoprazole.  There is no nausea or vomiting but occasionally she needs to bring up her food.  There is no weight loss or anorexia.  She is having hot flashes.\par \par She had an episode of acute dysphagia after eating a soft pretzel 3 days ago. Her boyfriend performed a heimlich maneuver. [de-identified] : LA grade b esophagitis, wilianki ring

## 2022-06-13 NOTE — PHYSICAL EXAM
[General Appearance - Alert] : alert [General Appearance - In No Acute Distress] : in no acute distress [Sclera] : the sclera and conjunctiva were normal [Extraocular Movements] : extraocular movements were intact [PERRL With Normal Accommodation] : pupils were equal in size, round, and reactive to light [Outer Ear] : the ears and nose were normal in appearance [Oropharynx] : the oropharynx was normal [Neck Appearance] : the appearance of the neck was normal [Neck Cervical Mass (___cm)] : no neck mass was observed [Jugular Venous Distention Increased] : there was no jugular-venous distention [Thyroid Diffuse Enlargement] : the thyroid was not enlarged [Thyroid Nodule] : there were no palpable thyroid nodules [Auscultation Breath Sounds / Voice Sounds] : lungs were clear to auscultation bilaterally [Full Pulse] : the pedal pulses are present [Edema] : there was no peripheral edema [Bowel Sounds] : normal bowel sounds [Abdomen Soft] : soft [Abdomen Tenderness] : non-tender [Abdomen Mass (___ Cm)] : no abdominal mass palpated [No CVA Tenderness] : no ~M costovertebral angle tenderness [No Spinal Tenderness] : no spinal tenderness [Abnormal Walk] : normal gait [Nail Clubbing] : no clubbing  or cyanosis of the fingernails [Musculoskeletal - Swelling] : no joint swelling seen [Skin Color & Pigmentation] : normal skin color and pigmentation [Motor Tone] : muscle strength and tone were normal [Skin Turgor] : normal skin turgor [] : no rash [Deep Tendon Reflexes (DTR)] : deep tendon reflexes were 2+ and symmetric [Sensation] : the sensory exam was normal to light touch and pinprick [No Focal Deficits] : no focal deficits [Oriented To Time, Place, And Person] : oriented to person, place, and time [Impaired Insight] : insight and judgment were intact [Affect] : the affect was normal

## 2022-06-17 NOTE — ED ADULT TRIAGE NOTE - NS ED NURSE BANDS TYPE
6/17/2022  IRoberta DO, saw and evaluated the patient  I have discussed the patient with the resident/non-physician practitioner and agree with the resident's/non-physician practitioner's findings, Plan of Care, and MDM as documented in the resident's/non-physician practitioner's note, except where noted  All available labs and Radiology studies were reviewed  I was present for key portions of any procedure(s) performed by the resident/non-physician practitioner and I was immediately available to provide assistance  At this point I agree with the current assessment done in the Emergency Department  I have conducted an independent evaluation of this patient a history and physical is as follows:    24 yof with LLQ abd pain  Denies dysuria or vaginal discharge  Last menstrual period 1 month ago  Sexually active but not for the last month  Uses protection  Had an IUD placed several weeks ago  Past Medical History:   Diagnosis Date    Asthma     Migraines      /90   Pulse 58   Temp (!) 97 4 °F (36 3 °C) (Temporal)   Resp 16   LMP 05/19/2022 (Approximate)   SpO2 99%     NAD, no resp distress, abd soft w suprapubic and LLQ TTP      Urinalysis and urine pregnancy test   Patient declines GC and chlamydia screening  Pelvic ultrasound with Dopplers      Re-evaluate        ED Course         Critical Care Time  Procedures
Name band;

## 2022-06-22 ENCOUNTER — APPOINTMENT (OUTPATIENT)
Dept: GASTROENTEROLOGY | Facility: AMBULATORY SURGERY CENTER | Age: 47
End: 2022-06-22
Payer: MEDICARE

## 2022-06-22 PROCEDURE — 43239 EGD BIOPSY SINGLE/MULTIPLE: CPT

## 2022-08-19 ENCOUNTER — APPOINTMENT (OUTPATIENT)
Dept: ORTHOPEDIC SURGERY | Facility: CLINIC | Age: 47
End: 2022-08-19

## 2023-01-06 ENCOUNTER — LABORATORY RESULT (OUTPATIENT)
Age: 48
End: 2023-01-06

## 2023-01-06 ENCOUNTER — APPOINTMENT (OUTPATIENT)
Dept: INFECTIOUS DISEASE | Facility: CLINIC | Age: 48
End: 2023-01-06
Payer: MEDICARE

## 2023-01-06 VITALS
OXYGEN SATURATION: 98 % | WEIGHT: 160 LBS | HEIGHT: 66 IN | HEART RATE: 72 BPM | BODY MASS INDEX: 25.71 KG/M2 | TEMPERATURE: 98.5 F | SYSTOLIC BLOOD PRESSURE: 116 MMHG | DIASTOLIC BLOOD PRESSURE: 82 MMHG | RESPIRATION RATE: 12 BRPM

## 2023-01-06 DIAGNOSIS — R76.8 OTHER SPECIFIED ABNORMAL IMMUNOLOGICAL FINDINGS IN SERUM: ICD-10-CM

## 2023-01-06 PROCEDURE — 99203 OFFICE O/P NEW LOW 30 MIN: CPT

## 2023-01-06 NOTE — DATA REVIEWED
[FreeTextEntry1] : 9/12/2022\par RPR 1:1\par FTA nonreactive\par \par 9/26/2022\par FTA non reactive

## 2023-01-06 NOTE — ASSESSMENT
[FreeTextEntry1] : 47 F presents today for possible syphilis.\par RPR with low positive titer of 1:1 with nonreactive FTA.\par This may be indicative of false positive syphilis.\par \par I will check all STI's today and check RPR titer and syphilis screen.\par \par If positive for syphilis, pt should have LP for csf studies to diagnose or rule out neurosyphillis. If testing today is negative for syphilis, then no need for LP from ID perspective  to diagnose syphilis but neurologist can decide if pt needs LP for other reasons.\par \par All questions answered.\par Pt asked i call her mother with results.\par \par

## 2023-01-06 NOTE — CONSULT LETTER
[Dear  ___] : Dear  [unfilled], [Please see my note below.] : Please see my note below. [Consult Closing:] : Thank you very much for allowing me to participate in the care of this patient.  If you have any questions, please do not hesitate to contact me. [Sincerely,] : Sincerely, [FreeTextEntry2] : Dr. Donny Medellin\jaxson Prado [FreeTextEntry3] : \par Bety Earl MD\par  of Medicine\par Division of Infectious Diseases\par [employer] [facility] [employer] [facility]\par [address] [place] [address] DrKaty\[place] [address]\par Tel: [phone]\par Fax: [phone] [DrKaty  ___] : Dr. BENITEZ

## 2023-01-06 NOTE — HISTORY OF PRESENT ILLNESS
[FreeTextEntry1] : This is a 46 yo F who presents today for possible syphilis, referred from neuology, Dr. Donny Medellin.\par \par Pt was seen by ID in 2014 after a tick bite but was not found to have any tick born infection then.\par \par Reports trouble with memory and on w/up from neurology had +RPR 1:1 but negative treponemal antibody  \par She denies ever being treated for syphilis.\par No known exposures.\par Mother and boyfriend with pt today.\par \par Reports headaches, fatigue, memory loss, back pain, depression, night sweats, muscle aches.  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

## 2023-01-06 NOTE — PHYSICAL EXAM
[General Appearance - Alert] : alert [General Appearance - In No Acute Distress] : in no acute distress [General Appearance - Well Nourished] : well nourished [Sclera] : the sclera and conjunctiva were normal [Extraocular Movements] : extraocular movements were intact [Outer Ear] : the ears and nose were normal in appearance [Oropharynx] : the oropharynx was normal with no thrush [Neck Appearance] : the appearance of the neck was normal [Auscultation Breath Sounds / Voice Sounds] : lungs were clear to auscultation bilaterally [Heart Rate And Rhythm] : heart rate was normal and rhythm regular [Heart Sounds] : normal S1 and S2 [Edema] : there was no peripheral edema [Bowel Sounds] : normal bowel sounds [Abdomen Soft] : soft [] : no rash [Skin Lesions] : no skin lesions [FreeTextEntry1] : tongue deviation [Oriented To Time, Place, And Person] : oriented to person, place, and time [Affect] : the affect was normal

## 2023-01-09 ENCOUNTER — NON-APPOINTMENT (OUTPATIENT)
Age: 48
End: 2023-01-09

## 2023-01-09 LAB
C TRACH RRNA SPEC QL NAA+PROBE: NOT DETECTED
HBV CORE IGG+IGM SER QL: NONREACTIVE
HBV SURFACE AB SER QL: NONREACTIVE
HBV SURFACE AG SER QL: NONREACTIVE
HCV AB SER QL: NONREACTIVE
HCV S/CO RATIO: 0.12 S/CO
HEPB DNA PCR INT: NOT DETECTED
HEPB DNA PCR LOG: NOT DETECTED LOGIU/ML
HIV1+2 AB SPEC QL IA.RAPID: NONREACTIVE
N GONORRHOEA RRNA SPEC QL NAA+PROBE: NOT DETECTED
SOURCE AMPLIFICATION: NORMAL
SOURCE AMPLIFICATION: NORMAL
T PALLIDUM AB SER QL IA: NEGATIVE
T VAGINALIS RRNA SPEC QL NAA+PROBE: NOT DETECTED

## 2023-01-10 ENCOUNTER — APPOINTMENT (OUTPATIENT)
Dept: NEUROLOGY | Facility: CLINIC | Age: 48
End: 2023-01-10

## 2023-01-12 ENCOUNTER — APPOINTMENT (OUTPATIENT)
Dept: NEUROLOGY | Facility: CLINIC | Age: 48
End: 2023-01-12

## 2023-01-26 ENCOUNTER — APPOINTMENT (OUTPATIENT)
Dept: MRI IMAGING | Facility: CLINIC | Age: 48
End: 2023-01-26

## 2023-02-02 ENCOUNTER — NON-APPOINTMENT (OUTPATIENT)
Age: 48
End: 2023-02-02

## 2023-02-22 ENCOUNTER — APPOINTMENT (OUTPATIENT)
Dept: NEUROLOGY | Facility: CLINIC | Age: 48
End: 2023-02-22

## 2023-03-02 NOTE — ED ADULT NURSE NOTE - FALL HARM RISK TYPE OF ASSESSMENT
[FreeTextEntry1] : CHIEF COMPLAINT: Hypothyroidism, Type 2 DM \par \par REFERRED BY: Hospital Discharge Follow-Up\par \par Available prior medical records reviewed. \par \par HISTORY OF PRESENTING ILLNESS:\par The patient is a 57 year old F being seen in the office today for evaluation of hypothyroidism and diabetes following recent hospitalization. 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 Has had multiple hospitalization in [facility]. Previously followed with Endocrinologist Dr. Karma Henriquez ([place]). Pt most recently hospitalized at [facility] from 23-3/1/23 due to bradycardia (2/2 labetalol) and chest pain. Endocrinology consulted due to c/f uncontrolled hypothyroidism with h/o myxedema coma and T2DM. TSH 11.5, Free T3 55, Total T4 2.4. Pt given stress dose levothyroxine 300 IV with repeat Total T4 2.5 after the dose. \par \par Home medications prior to hospitalization include Levothyroxine 125 mcg, Tresiba 3u QHS, Repaglinide 1 mg TID. A1c 6.1 (2023). Discharged on Januvia 25 mg daily, Repaglinide  0.5 mg TID, and levothyroxine 125 mcg. Discontinued Tresiba.\par \par THYROID: \par \par Takes levothyroxine 125 mcg at 6 AM. Takes all morning medications at the same time with levothyroxine. Waits at least 30 minutes to eat.\par \par Hypo/Hyperthyroid symptoms: \par No constipation or increased frequency of BMs. No weight gain. Notes weight loss due to diuretic use. Endorses No heat intolerant Endorses cold intolerance. No fatigue.\par No palpitations. No tremors. No anxiety or depression. No mental slowing. \par Positive skin (dry) or hair changes (hair loss). No facial or peripheral edema. \par Compressive symptoms: No neck swelling, no dysphagia, no dyspnea. Positive change in voice (lower).\par \par No family history of thyroid disease or thyroid cancer. \par No history of biotin intake. \par No personal history of radiation exposure in the head and neck area. \par \par DIABETES HPI: \par Type of Diabetes: T2DM\par Duration of Diabetes: 24 years, pt initially diagnosed with GDM in \par \par A1c: 6.1 (2023)\par \par Current home regimen: Januvia 25 mg daily, Repaglinide  0.5 mg TID\par Notes numbness in feet b/l, blurry vision. S/p cataract surgery\par \par Diabetes complications: \par Microvascular: [x] neuropathy, [-] nephropathy, [?] retinopathy - per pt h/o retinal hemorrhages, unclear HTN vs DM related\par Macrovascular: [-] CAD, [-] CVA, [-] PAD\par \par Last retinopathy screenin2023, noted retinal hemorrhage, no dx of DM retinopathy\par Last nephropathy screening (urine ACR): Due\par Last foot exam/podiatry visit: Foot exam 3/2/23\par \par BG self monitoring: Fingersticks 3x/day\par BG Trends: 127 fasting today on new regimen\par Hypoglycemia: per daughter, none prior to recent hospitalization\par \par Diet: fish, alcantara, pasta, lasagna, basmati diabetic rice\par Exercise: no dedicated exercise\par \par Comorbidities: HTN, HLD, Obesity, CKD, pulmonary HTN, Hypothyroid\par \par PERTINENT LABS: \par \par 3/1/2023 eGFR 30\par \par 2023\par TSH 11.5\par A1c 6.1%\par Total T3 55\par \par 2022\par TPO antibodies positive\par \par MEDICATIONS:\par Atorvastatin 40 mg 1 tab PO daily\par Bumetanide 1 mg 1 tab PO BID\par Hydralazine 50 mg PO 1 tab every 8 hours\par Januvia 25 mg PO daily\par Levothyroxine 125 mcg 1 tab PO daily\par Omeprazole 20 mg 1 tab PO daily\par Prandin 1 mg tab PO TID before meals\par \par SOCIAL:\par No tobacco or alcohol use\par  Daily Assessment

## 2023-03-14 ENCOUNTER — APPOINTMENT (OUTPATIENT)
Dept: MRI IMAGING | Facility: CLINIC | Age: 48
End: 2023-03-14

## 2023-03-14 ENCOUNTER — APPOINTMENT (OUTPATIENT)
Dept: MRI IMAGING | Facility: CLINIC | Age: 48
End: 2023-03-14
Payer: MEDICARE

## 2023-03-14 ENCOUNTER — OUTPATIENT (OUTPATIENT)
Dept: OUTPATIENT SERVICES | Facility: HOSPITAL | Age: 48
LOS: 1 days | End: 2023-03-14
Payer: MEDICARE

## 2023-03-14 DIAGNOSIS — G51.8 OTHER DISORDERS OF FACIAL NERVE: ICD-10-CM

## 2023-03-14 PROCEDURE — 70544 MR ANGIOGRAPHY HEAD W/O DYE: CPT | Mod: MH

## 2023-03-14 PROCEDURE — 70547 MR ANGIOGRAPHY NECK W/O DYE: CPT | Mod: MH

## 2023-03-14 PROCEDURE — 70547 MR ANGIOGRAPHY NECK W/O DYE: CPT | Mod: 26,MH

## 2023-03-14 PROCEDURE — 70553 MRI BRAIN STEM W/O & W/DYE: CPT | Mod: MH

## 2023-03-14 PROCEDURE — 70553 MRI BRAIN STEM W/O & W/DYE: CPT | Mod: 26,MH

## 2023-03-14 PROCEDURE — 70544 MR ANGIOGRAPHY HEAD W/O DYE: CPT | Mod: 26,59,MH

## 2023-03-14 PROCEDURE — A9585: CPT

## 2023-03-15 ENCOUNTER — APPOINTMENT (OUTPATIENT)
Dept: MRI IMAGING | Facility: CLINIC | Age: 48
End: 2023-03-15

## 2023-03-31 ENCOUNTER — APPOINTMENT (OUTPATIENT)
Dept: GASTROENTEROLOGY | Facility: CLINIC | Age: 48
End: 2023-03-31
Payer: MEDICARE

## 2023-03-31 VITALS
HEIGHT: 66 IN | WEIGHT: 160 LBS | OXYGEN SATURATION: 98 % | HEART RATE: 68 BPM | BODY MASS INDEX: 25.71 KG/M2 | TEMPERATURE: 97 F | RESPIRATION RATE: 14 BRPM | SYSTOLIC BLOOD PRESSURE: 112 MMHG | DIASTOLIC BLOOD PRESSURE: 84 MMHG

## 2023-03-31 DIAGNOSIS — F31.9 BIPOLAR DISORDER, UNSPECIFIED: ICD-10-CM

## 2023-03-31 PROCEDURE — 99214 OFFICE O/P EST MOD 30 MIN: CPT

## 2023-03-31 RX ORDER — DOXYCYCLINE HYCLATE 100 MG/1
100 CAPSULE ORAL
Qty: 14 | Refills: 1 | Status: DISCONTINUED | COMMUNITY
Start: 2022-04-14 | End: 2023-03-31

## 2023-03-31 RX ORDER — PANTOPRAZOLE 40 MG/1
40 TABLET, DELAYED RELEASE ORAL DAILY
Qty: 90 | Refills: 3 | Status: DISCONTINUED | COMMUNITY
Start: 2022-04-14 | End: 2023-03-31

## 2023-03-31 NOTE — ASSESSMENT
[FreeTextEntry1] : 47-year-old female history of bipolar disorder, mixed L5 with chronic GERD.  She uses occasional pantoprazole.  She recently had exacerbation of her constipation.  She was in the ER where she was suggested to hospital or a CAT scan demonstrated large amount of stool in the colon without any wall thickening.  She uses Dulcolax each day.  She has gained 8 pounds.  She has occasional nausea but no vomiting and she does not move her bowels.  There is no family history of colorectal cancer\par \par IMP:\par 1. functional constipation\par 2. BPD\par 3. GERD\par \par PLAN:\par 1. RTO 2months\par 2. Trial of lubiprostone 24 ug bid\par 3. If no help, will proceed to colonoscopy at next visit

## 2023-03-31 NOTE — HISTORY OF PRESENT ILLNESS
[FreeTextEntry1] : 47-year-old female history of bipolar disorder, mixed L5 with chronic GERD.  She uses occasional pantoprazole.  She recently had exacerbation of her constipation.  She was in the ER where she was suggested to hospital or a CAT scan demonstrated large amount of stool in the colon without any wall thickening.  She uses Dulcolax each day.  She has gained 8 pounds.  She has occasional nausea but no vomiting and she does not move her bowels.  There is no family history of colorectal cancer [de-identified] : 2022 gastritis

## 2023-04-11 ENCOUNTER — APPOINTMENT (OUTPATIENT)
Dept: NEUROLOGY | Facility: CLINIC | Age: 48
End: 2023-04-11

## 2023-05-31 ENCOUNTER — APPOINTMENT (OUTPATIENT)
Dept: GASTROENTEROLOGY | Facility: CLINIC | Age: 48
End: 2023-05-31
Payer: MEDICARE

## 2023-05-31 VITALS
OXYGEN SATURATION: 98 % | HEIGHT: 66 IN | RESPIRATION RATE: 16 BRPM | WEIGHT: 158 LBS | BODY MASS INDEX: 25.39 KG/M2 | HEART RATE: 70 BPM | DIASTOLIC BLOOD PRESSURE: 70 MMHG | TEMPERATURE: 97.1 F | SYSTOLIC BLOOD PRESSURE: 106 MMHG

## 2023-05-31 DIAGNOSIS — Z86.59 PERSONAL HISTORY OF OTHER MENTAL AND BEHAVIORAL DISORDERS: ICD-10-CM

## 2023-05-31 DIAGNOSIS — G40.909 EPILEPSY, UNSPECIFIED, NOT INTRACTABLE, W/OUT STATUS EPILEPTICUS: ICD-10-CM

## 2023-05-31 PROCEDURE — 99214 OFFICE O/P EST MOD 30 MIN: CPT

## 2023-05-31 RX ORDER — PANTOPRAZOLE 40 MG/1
40 TABLET, DELAYED RELEASE ORAL DAILY
Qty: 90 | Refills: 2 | Status: DISCONTINUED | COMMUNITY
Start: 2022-06-22 | End: 2023-05-31

## 2023-05-31 RX ORDER — LUBIPROSTONE 24 UG/1
24 CAPSULE ORAL TWICE DAILY
Qty: 60 | Refills: 1 | Status: DISCONTINUED | COMMUNITY
Start: 2023-03-31 | End: 2023-05-31

## 2023-05-31 NOTE — PHYSICAL EXAM

## 2023-05-31 NOTE — HISTORY OF PRESENT ILLNESS
[FreeTextEntry1] : 46 yo female, with chronic constipation .  She has a history of chronic constipation for many years.  There is no weight loss or anorexia or rectal bleeding.  She denies any nausea or vomiting.  She previously had an esophageal stricture which was dilated and has occasional GERD symptoms.  She is not using any PPI.

## 2023-05-31 NOTE — ASSESSMENT
[FreeTextEntry1] : 46 yo female, with chronic constipation .  She has a history of chronic constipation for many years.  There is no weight loss or anorexia or rectal bleeding.  She denies any nausea or vomiting.  She previously had an esophageal stricture which was dilated and has occasional GERD symptoms.  She is not using any PPI.\par \par IMP:\par 1. chronic constipation\par 2. GERD\par 3. Esophageal dysphagia/hx of stricture\par \par PLAN:\par 1. gavilyte\par 2. Senokot prn\par 3. Resume ppi\par 4. RTO 2months. Repeat egd if dysphagia worsens.

## 2023-05-31 NOTE — REVIEW OF SYSTEMS
[As Noted in HPI] : as noted in HPI [Constipation] : constipation [Heartburn] : heartburn [Negative] : Heme/Lymph [Sore Throat] : no sore throat [Chest Pain] : no chest pain [Palpitations] : no palpitations

## 2023-12-07 ENCOUNTER — APPOINTMENT (OUTPATIENT)
Dept: PAIN MANAGEMENT | Facility: CLINIC | Age: 48
End: 2023-12-07
Payer: MEDICARE

## 2023-12-07 VITALS
DIASTOLIC BLOOD PRESSURE: 77 MMHG | SYSTOLIC BLOOD PRESSURE: 125 MMHG | HEART RATE: 96 BPM | WEIGHT: 147 LBS | BODY MASS INDEX: 23.63 KG/M2 | HEIGHT: 66 IN

## 2023-12-07 PROCEDURE — 99204 OFFICE O/P NEW MOD 45 MIN: CPT

## 2023-12-07 RX ORDER — TRAZODONE HYDROCHLORIDE 300 MG/1
300 TABLET ORAL AT BEDTIME
Refills: 0 | Status: ACTIVE | COMMUNITY

## 2023-12-07 RX ORDER — OXYCODONE HYDROCHLORIDE 30 MG/1
30 TABLET ORAL
Refills: 0 | Status: ACTIVE | COMMUNITY

## 2024-01-22 NOTE — BEHAVIORAL HEALTH ASSESSMENT NOTE - REMOTE MEMORY
[FreeTextEntry1] : RV dilatation without clear etiology. Mild valve disease (MR and TR) No clinical CHF.  Atypical chest pain Unlikely angina.  Possible radiculopathy.  Normotensive today.
Normal

## 2024-04-22 ENCOUNTER — APPOINTMENT (OUTPATIENT)
Dept: GASTROENTEROLOGY | Facility: CLINIC | Age: 49
End: 2024-04-22
Payer: MEDICARE

## 2024-04-22 VITALS
RESPIRATION RATE: 16 BRPM | HEIGHT: 66 IN | DIASTOLIC BLOOD PRESSURE: 74 MMHG | BODY MASS INDEX: 23.95 KG/M2 | OXYGEN SATURATION: 96 % | TEMPERATURE: 97.7 F | WEIGHT: 149 LBS | SYSTOLIC BLOOD PRESSURE: 120 MMHG | HEART RATE: 102 BPM

## 2024-04-22 DIAGNOSIS — Z12.11 ENCOUNTER FOR SCREENING FOR MALIGNANT NEOPLASM OF COLON: ICD-10-CM

## 2024-04-22 DIAGNOSIS — R11.0 NAUSEA: ICD-10-CM

## 2024-04-22 DIAGNOSIS — R13.19 OTHER DYSPHAGIA: ICD-10-CM

## 2024-04-22 DIAGNOSIS — Z87.19 PERSONAL HISTORY OF OTHER DISEASES OF THE DIGESTIVE SYSTEM: ICD-10-CM

## 2024-04-22 DIAGNOSIS — R14.0 ABDOMINAL DISTENSION (GASEOUS): ICD-10-CM

## 2024-04-22 DIAGNOSIS — K21.9 GASTRO-ESOPHAGEAL REFLUX DISEASE W/OUT ESOPHAGITIS: ICD-10-CM

## 2024-04-22 DIAGNOSIS — K59.09 OTHER CONSTIPATION: ICD-10-CM

## 2024-04-22 PROCEDURE — 99214 OFFICE O/P EST MOD 30 MIN: CPT

## 2024-04-22 RX ORDER — LINACLOTIDE 290 UG/1
290 CAPSULE, GELATIN COATED ORAL
Qty: 30 | Refills: 2 | Status: ACTIVE | COMMUNITY
Start: 2023-06-02

## 2024-04-22 RX ORDER — SODIUM PICOSULFATE, MAGNESIUM OXIDE, AND ANHYDROUS CITRIC ACID 12; 3.5; 1 G/175ML; G/175ML; MG/175ML
10-3.5-12 MG-GM LIQUID ORAL
Qty: 350 | Refills: 0 | Status: ACTIVE | COMMUNITY
Start: 2024-04-22 | End: 1900-01-01

## 2024-04-22 RX ORDER — ESOMEPRAZOLE MAGNESIUM 40 MG/1
40 CAPSULE, DELAYED RELEASE ORAL
Qty: 90 | Refills: 1 | Status: COMPLETED | COMMUNITY
Start: 2023-05-31 | End: 2024-04-22

## 2024-04-22 RX ORDER — LUBIPROSTONE 24 UG/1
24 CAPSULE ORAL
Qty: 60 | Refills: 11 | Status: COMPLETED | COMMUNITY
Start: 2023-05-31 | End: 2024-04-22

## 2024-04-22 RX ORDER — LAMOTRIGINE 2 MG/1
TABLET, FOR SUSPENSION ORAL
Refills: 0 | Status: COMPLETED | COMMUNITY
End: 2024-04-22

## 2024-04-22 RX ORDER — POLYETHYLENE GLYCOL-3350 AND ELECTROLYTES WITH FLAVOR PACK 240; 5.84; 2.98; 6.72; 22.72 G/278.26G; G/278.26G; G/278.26G; G/278.26G; G/278.26G
240 POWDER, FOR SOLUTION ORAL
Qty: 1 | Refills: 0 | Status: COMPLETED | COMMUNITY
Start: 2023-06-02 | End: 2024-04-22

## 2024-04-22 RX ORDER — LINACLOTIDE 290 UG/1
290 CAPSULE, GELATIN COATED ORAL
Qty: 30 | Refills: 2 | Status: ACTIVE | COMMUNITY
Start: 2024-04-22 | End: 1900-01-01

## 2024-04-22 RX ORDER — ESOMEPRAZOLE MAGNESIUM 40 MG/1
40 CAPSULE, DELAYED RELEASE ORAL DAILY
Qty: 90 | Refills: 0 | Status: ACTIVE | COMMUNITY
Start: 2024-04-22 | End: 1900-01-01

## 2024-04-22 NOTE — HISTORY OF PRESENT ILLNESS
[FreeTextEntry1] : 49 y/o female presents today for follow up of nausea and constipation, hx chronic constipation, dysphagia, GERD and bipolar disorder. Patient states that she was a  at [employer] program. Patient last saw Dr. Martel 5/31/23, at that time she had gone 8 days without BM. Patient noted with history of esophageal stricture. Patient complains of worsening dysphagia, constipation, nausea and bloating. Patient has never had a colonoscopy before. Patient currently taking ranitidine OTC for GERD, with no relief. Patient does not currently take anything for chronic constipation. Patient denies any family history of colon cancer. Up to date on mammograms and pap smears. Current 1 pack per day smoker. Hx of epilepsy, seizure free for 5 years.   Plan: 1. EGD/Colonoscopy with clenpiq 2. Rx sent for linzess 290 mcg daily  3. Rx sent for esomeprazole 40 mg daily qAM   [de-identified] : 6/22/2022: Acute gastritis. Benign appearing esophageal stenosis-dilated.

## 2024-04-22 NOTE — ASSESSMENT
[FreeTextEntry1] : 49 y/o female presents today for follow up of nausea and constipation, hx chronic constipation, dysphagia, GERD and bipolar disorder. Patient states that she was a  at [employer] program. Patient last saw Dr. Martel 5/31/23, at that time she had gone 8 days without BM. Patient noted with history of esophageal stricture. Patient complains of worsening dysphagia, constipation, nausea and bloating. Patient has never had a colonoscopy before. Patient currently taking ranitidine OTC for GERD, with no relief. Patient does not currently take anything for chronic constipation. Patient denies any family history of colon cancer. Up to date on mammograms and pap smears. Current 1 pack per day smoker. Hx of epilepsy, seizure free for 5 years.   Plan: 1. EGD/Colonoscopy with clenpiq 2. Rx sent for linzess 290 mcg daily  3. Rx sent for esomeprazole 40 mg daily qAM  Patient agreed to schedule EGD/colonoscopy procedure. [facility] with the assistance of an anesthesiologist. The patient was given a colonoscopy preparation prescription and understood the procedure as it was explained to them. Patient was given a booklet distributed by the American Society of G[employer] explaining the procedure in detail and they understood the risks of the procedure not limited to infection, bleeding, perforation or non- diagnosis of colorectal cancer. Patient was advised that they could not drive home, if they choose to receive sedation. Further diagnostic and treatment recommendations will be based upon the procedure and any biopsies, if they are taken.   Thank you for allowing me to participate in this [facility] care.

## 2024-04-22 NOTE — PHYSICAL EXAM

## 2024-04-22 NOTE — REASON FOR VISIT
[Follow-up] : a follow-up of an existing diagnosis [FreeTextEntry1] : constipation and nausea, hx chronic constipation and dysphagia

## 2024-04-26 NOTE — ED ADULT TRIAGE NOTE - WEIGHT IN KG
57.6 Time spent in coordination of care for HUY and speaking with mother over the phone. See details of assessment and plan above.

## 2024-05-01 NOTE — ED ADULT NURSE NOTE - CARDIO WDL
Rapid Response   Responding MD Dr. Nunez   STAT RN Amanda   Primary RN Hien   Additional Notes Secondary   Rapid Response Neuro Alert   Location/Unit at Time of Call ED G20@1120   Team Arrival 1125   End Time 1200   Primary Reason for Call Staff Concern   Call Initiated by Team Member   Interventions During Call CT Head   Admission Source ED         STROKE ALERT INFORMATION:   Date of Call: 5/1/2024   Admission Source: ED (05/01/24 1125)  via Privately Owned Vehicle       STROKE ASSESSMENTS:  -Last Known Well Date: 05/01/24 at Last Known Well Time: 0000   -Initial NIHSS Score: 1 see scale items below.    -Patient’s dysphagia screening score: Dysphagia Screening Result: PASS  -Speech therapy contacted No  -Premorbid Modified Jennifer: :No significant disability despite symptoms: able to carry out all usual duties and activities (05/01/24 1125)    SNO Scale    Gaze Deviation  No    Global or Expressive Aphasia  No    One-Side Neglect  No    ACUTE INTERVENTION DATE  Stroke Intervention(s):  No pharmacological/mechanical intervention (05/01/24 1125)    IV Tenecteplase Contraindications:  last known well time is greater that 4.5 hours.    Please consult on-call acute stroke neurologist if further stroke work-up is warranted.     For any deterioration in status or urgent needs, page 22 and request the stroke RN.     For any routine needs, please call [phone] 8am-4pm Mon-Fri    Stroke Nurse Navigator to follow for 24 hour NIHSS, stroke metrics and education.  NIH Stroke Scale 1 (05/01/24 1125)    Assessment Interval  Initial     Level of Consciousness 0 Alert   LOC Questions 0 Answers both questions correctly   LOC Commands 0 Performs both tasks correctly   Best Gaze 0 Normal   Visual 0 No visual loss   Facial Palsy 0 Normal   Motor Arm-Left 0 No drift   Motor Arm-Right 0 No drift   Motor Leg-Left 0 No drift   Motor Leg-Right 0 No drift   Limb Ataxia 0 Absent   Sensory 1 (left cheekbone down. extention to corner of lip)  Mild-to-moderate sensory loss (left cheekbone down. extention to corner of lip)   Best Language 0 No aphasia   Dysarthria 0 Normal articulation   Extinction and Inattention 0 No abnormality   NIHSS Score 1        EDUCATION Questions were encouraged and answered.                   Normal rate, regular rhythm, normal S1, S2 heart sounds heard.

## 2024-05-16 ENCOUNTER — APPOINTMENT (OUTPATIENT)
Dept: NEUROLOGY | Facility: CLINIC | Age: 49
End: 2024-05-16

## 2024-06-10 RX ORDER — SODIUM SULFATE, MAGNESIUM SULFATE, AND POTASSIUM CHLORIDE 17.75; 2.7; 2.25 G/1; G/1; G/1
1479-225-188 TABLET ORAL
Qty: 24 | Refills: 0 | Status: ACTIVE | COMMUNITY
Start: 2024-06-10 | End: 1900-01-01

## 2024-06-11 ENCOUNTER — APPOINTMENT (OUTPATIENT)
Dept: GASTROENTEROLOGY | Facility: AMBULATORY SURGERY CENTER | Age: 49
End: 2024-06-11

## 2024-06-11 ENCOUNTER — NON-APPOINTMENT (OUTPATIENT)
Age: 49
End: 2024-06-11

## 2024-07-17 ENCOUNTER — RX RENEWAL (OUTPATIENT)
Age: 49
End: 2024-07-17

## 2024-07-23 ENCOUNTER — APPOINTMENT (OUTPATIENT)
Dept: INFECTIOUS DISEASE | Facility: CLINIC | Age: 49
End: 2024-07-23
Payer: MEDICARE

## 2024-07-23 VITALS
HEIGHT: 66 IN | BODY MASS INDEX: 23.78 KG/M2 | HEART RATE: 98 BPM | WEIGHT: 148 LBS | OXYGEN SATURATION: 97 % | TEMPERATURE: 97.8 F | SYSTOLIC BLOOD PRESSURE: 111 MMHG | DIASTOLIC BLOOD PRESSURE: 74 MMHG

## 2024-07-23 PROCEDURE — 99212 OFFICE O/P EST SF 10 MIN: CPT

## 2024-07-23 NOTE — HISTORY OF PRESENT ILLNESS
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  Seen in 1/2023 for  possible syphilis, referred from neuology, Dr. Donny Medellin.  Found to have false positive RPR. FTA was negative at that time.  Pt was also seen by ID in 2014 after a tick bite but was not found to have any tick born infection then.  Now returns b/c on routine blood work found to have HSV  1 & 2 IgG positive. She has never had a cold sore, lip outbreak, or vaginal outbreak. She has not known about this seropositivity in the past. Has never taken meds for HSV in the past. Her long term boyfriend 27 years just passed away suddenly 6/2024 from MI.

## 2024-07-23 NOTE — ASSESSMENT
[FreeTextEntry1] : 48 F presents today for HSV 1 & 2 seropositivity. No signs of outbreak today. Pt denies any prior outbreaks.  Discussed HSV with pt and what it means to have the antibodies. Explained to pt that she was exposed at some point in her life. Unable to tell when.  She should monitor for cold sores or vaginal blisters. At first onset should seek medical attention. Will hold off on giving meds today b/c she does not have outbreak and has never had outbreak in the past.  She can return as needed.  She will discuss other results with her PCP. Elevated CEA also noted.

## 2024-07-23 NOTE — PHYSICAL EXAM
[General Appearance - Alert] : alert [General Appearance - In No Acute Distress] : in no acute distress [General Appearance - Well Nourished] : well nourished [Sclera] : the sclera and conjunctiva were normal [Extraocular Movements] : extraocular movements were intact [Outer Ear] : the ears and nose were normal in appearance [Oropharynx] : the oropharynx was normal with no thrush [Neck Appearance] : the appearance of the neck was normal [Heart Rate And Rhythm] : heart rate was normal and rhythm regular [Auscultation Breath Sounds / Voice Sounds] : lungs were clear to auscultation bilaterally [Heart Sounds] : normal S1 and S2 [Edema] : there was no peripheral edema [Bowel Sounds] : normal bowel sounds [Abdomen Soft] : soft [] : no rash [Skin Lesions] : no skin lesions [FreeTextEntry1] : no cold sores [Oriented To Time, Place, And Person] : oriented to person, place, and time [Affect] : the affect was normal

## 2024-07-23 NOTE — CONSULT LETTER
[Dear  ___] : Dear  [unfilled], [Please see my note below.] : Please see my note below. [Consult Closing:] : Thank you very much for allowing me to participate in the care of this patient.  If you have any questions, please do not hesitate to contact me. [Sincerely,] : Sincerely, [FreeTextEntry2] : Dr. Marcin Luna [FreeTextEntry3] : Bety Earl MD  of Medicine Division of Infectious Diseases [facility] [employer] [facility] [employer] [address] [place] [address] Tel: [phone] Fax: [phone]

## 2025-04-18 NOTE — H&P ADULT - NEGATIVE GENERAL SYMPTOMS
No prediabetes at this time!
Taras Nugent.  Your LDL cholesterol got very high.  When people have an LDL this high, despite having good HDL, it's suggested to start a cholesterol medication (I suggest rosuvastatin 40mg every night). Please let me know if you're ok with starting a cholesterol medication to prevent cardiac disease, strokes, and cholesterol build up in your vessels and liver.     Please continue eating a heart-healthy, plant forward diet with more emphasis on vegetables, fruits & whole grains, regular aerobic exercises (30min-1hr daily), maintenance of desirable body weight and avoidance of alcohol and tobacco products.    Please let me know if you have any questions or concerns.    Thank you for trusting us with your care. It was a pleasure seeing you.  ADITYA Crain CNP on 4/17/2025 at 8:17 PM      
no fever/no sweating

## 2025-07-07 ENCOUNTER — NON-APPOINTMENT (OUTPATIENT)
Age: 50
End: 2025-07-07

## 2025-07-10 NOTE — ED ADULT NURSE NOTE - MODE OF DISCHARGE
Show Applicator Variable?: Yes
Consent: The patient's consent was obtained including but not limited to risks of crusting, scabbing, blistering, recurrence, incomplete removal
Render Post-Care Instructions In Note?: no
Number Of Freeze-Thaw Cycles: 2 freeze-thaw cycles
Post-Care Instructions: I reviewed with the patient in detail post-care instructions. Patient is to wear sunprotection, and avoid picking at any of the treated lesions. Pt may apply Vaseline to crusted or scabbing areas.
Detail Level: Simple
Duration Of Freeze Thaw-Cycle (Seconds): 0
Ambulatory

## 2025-07-15 ENCOUNTER — LABORATORY RESULT (OUTPATIENT)
Age: 50
End: 2025-07-15

## 2025-07-15 ENCOUNTER — APPOINTMENT (OUTPATIENT)
Dept: INFECTIOUS DISEASE | Facility: CLINIC | Age: 50
End: 2025-07-15
Payer: MEDICARE

## 2025-07-15 VITALS
OXYGEN SATURATION: 99 % | HEIGHT: 66 IN | HEART RATE: 115 BPM | BODY MASS INDEX: 19.93 KG/M2 | DIASTOLIC BLOOD PRESSURE: 88 MMHG | TEMPERATURE: 98.1 F | SYSTOLIC BLOOD PRESSURE: 140 MMHG | WEIGHT: 124 LBS

## 2025-07-15 PROBLEM — B37.31 VAGINAL YEAST INFECTION: Status: ACTIVE | Noted: 2025-07-15 | Resolved: 2025-08-14

## 2025-07-15 PROCEDURE — 99213 OFFICE O/P EST LOW 20 MIN: CPT

## 2025-07-15 RX ORDER — CLOTRIMAZOLE 1% ANTIFUNGAL CREAM 1 G/100G
1 CREAM TOPICAL TWICE DAILY
Qty: 1 | Refills: 0 | Status: ACTIVE | COMMUNITY
Start: 2025-07-15 | End: 1900-01-01

## 2025-07-18 ENCOUNTER — NON-APPOINTMENT (OUTPATIENT)
Age: 50
End: 2025-07-18

## 2025-08-18 ENCOUNTER — RX RENEWAL (OUTPATIENT)
Age: 50
End: 2025-08-18

## 2025-08-18 DIAGNOSIS — B37.2 CANDIDIASIS OF SKIN AND NAIL: ICD-10-CM
